# Patient Record
Sex: FEMALE | Race: WHITE | NOT HISPANIC OR LATINO | Employment: OTHER | URBAN - METROPOLITAN AREA
[De-identification: names, ages, dates, MRNs, and addresses within clinical notes are randomized per-mention and may not be internally consistent; named-entity substitution may affect disease eponyms.]

---

## 2017-01-25 ENCOUNTER — OFFICE VISIT (OUTPATIENT)
Dept: BARIATRICS/WEIGHT MGMT | Facility: CLINIC | Age: 52
End: 2017-01-25

## 2017-01-25 VITALS
SYSTOLIC BLOOD PRESSURE: 120 MMHG | WEIGHT: 293 LBS | RESPIRATION RATE: 18 BRPM | HEIGHT: 66 IN | DIASTOLIC BLOOD PRESSURE: 78 MMHG | BODY MASS INDEX: 47.09 KG/M2 | TEMPERATURE: 97.8 F | OXYGEN SATURATION: 99 % | HEART RATE: 66 BPM

## 2017-01-25 DIAGNOSIS — I10 ESSENTIAL HYPERTENSION: ICD-10-CM

## 2017-01-25 DIAGNOSIS — E03.8 OTHER SPECIFIED HYPOTHYROIDISM: ICD-10-CM

## 2017-01-25 DIAGNOSIS — Z98.84 STATUS POST BARIATRIC SURGERY: Primary | ICD-10-CM

## 2017-01-25 DIAGNOSIS — E55.9 VITAMIN D DEFICIENCY: ICD-10-CM

## 2017-01-25 DIAGNOSIS — E53.8 B12 DEFICIENCY: ICD-10-CM

## 2017-01-25 DIAGNOSIS — D50.9 IRON DEFICIENCY ANEMIA, UNSPECIFIED IRON DEFICIENCY ANEMIA TYPE: ICD-10-CM

## 2017-01-25 DIAGNOSIS — E66.01 MORBID OBESITY DUE TO EXCESS CALORIES (HCC): ICD-10-CM

## 2017-01-25 PROCEDURE — 99024 POSTOP FOLLOW-UP VISIT: CPT | Performed by: PHYSICIAN ASSISTANT

## 2017-01-25 RX ORDER — TERBINAFINE HYDROCHLORIDE 250 MG/1
TABLET ORAL
Refills: 0 | COMMUNITY
Start: 2017-01-05 | End: 2017-12-29

## 2017-01-25 RX ORDER — AVOBENZONE, HOMOSALATE, OCTISALATE, OCTOCRYLENE 30; 100; 50; 25 MG/ML; MG/ML; MG/ML; MG/ML
SPRAY TOPICAL
Refills: 0 | COMMUNITY
Start: 2017-01-17 | End: 2017-06-30

## 2017-01-30 LAB
25(OH)D3+25(OH)D2 SERPL-MCNC: 34.3 NG/ML
A-TOCOPHEROL VIT E SERPL-MCNC: 6.2 MG/L (ref 5.3–16.8)
ALBUMIN SERPL-MCNC: 4.3 G/DL (ref 3.2–4.8)
ALBUMIN/GLOB SERPL: 1.4 G/DL (ref 1.5–2.5)
ALP SERPL-CCNC: 104 U/L (ref 25–100)
ALT SERPL-CCNC: 54 U/L (ref 7–40)
AST SERPL-CCNC: 45 U/L (ref 0–33)
BILIRUB SERPL-MCNC: 0.6 MG/DL (ref 0.3–1.2)
BUN SERPL-MCNC: 16 MG/DL (ref 9–23)
BUN/CREAT SERPL: 26.7 (ref 7–25)
CALCIUM SERPL-MCNC: 10.5 MG/DL (ref 8.7–10.4)
CHLORIDE SERPL-SCNC: 103 MMOL/L (ref 99–109)
CO2 SERPL-SCNC: 34 MMOL/L (ref 20–31)
CREAT SERPL-MCNC: 0.6 MG/DL (ref 0.6–1.3)
ERYTHROCYTE [DISTWIDTH] IN BLOOD BY AUTOMATED COUNT: 13.8 % (ref 11.3–14.5)
FERRITIN SERPL-MCNC: 124 NG/ML (ref 10–291)
FOLATE SERPL-MCNC: 12.98 NG/ML (ref 3.2–20)
GLOBULIN SER CALC-MCNC: 3 GM/DL
GLUCOSE SERPL-MCNC: 79 MG/DL (ref 70–100)
HCT VFR BLD AUTO: 48.2 % (ref 34.5–44)
HGB BLD-MCNC: 15.7 G/DL (ref 11.5–15.5)
IRON SERPL-MCNC: 101 MCG/DL (ref 50–175)
MAGNESIUM SERPL-MCNC: 1.8 MG/DL (ref 1.3–2.7)
MCH RBC QN AUTO: 31.2 PG (ref 27–31)
MCHC RBC AUTO-ENTMCNC: 32.6 G/DL (ref 32–36)
MCV RBC AUTO: 95.8 FL (ref 80–99)
METHYLMALONATE SERPL-SCNC: 109 NMOL/L (ref 0–378)
PHOSPHATE SERPL-MCNC: 3.1 MG/DL (ref 2.4–5.1)
PLATELET # BLD AUTO: 175 10*3/MM3 (ref 150–450)
POTASSIUM SERPL-SCNC: 4.6 MMOL/L (ref 3.5–5.5)
PREALB SERPL-MCNC: 17 MG/DL (ref 10–36)
PROT SERPL-MCNC: 7.3 G/DL (ref 5.7–8.2)
PTH-INTACT SERPL-MCNC: 34 PG/ML (ref 15–65)
RBC # BLD AUTO: 5.03 10*6/MM3 (ref 3.89–5.14)
SODIUM SERPL-SCNC: 143 MMOL/L (ref 132–146)
VIT A SERPL-MCNC: 30 UG/DL (ref 20–65)
VIT B1 BLD-SCNC: 209.4 NMOL/L (ref 66.5–200)
WBC # BLD AUTO: 5 10*3/MM3 (ref 3.5–10.8)
ZINC SERPL-MCNC: 88 UG/DL (ref 56–134)

## 2017-03-31 ENCOUNTER — OFFICE VISIT (OUTPATIENT)
Dept: BARIATRICS/WEIGHT MGMT | Facility: CLINIC | Age: 52
End: 2017-03-31

## 2017-03-31 VITALS
HEIGHT: 66 IN | WEIGHT: 278.5 LBS | OXYGEN SATURATION: 99 % | RESPIRATION RATE: 18 BRPM | HEART RATE: 84 BPM | SYSTOLIC BLOOD PRESSURE: 115 MMHG | DIASTOLIC BLOOD PRESSURE: 70 MMHG | TEMPERATURE: 98.8 F | BODY MASS INDEX: 44.76 KG/M2

## 2017-03-31 DIAGNOSIS — R10.13 DYSPEPSIA: ICD-10-CM

## 2017-03-31 DIAGNOSIS — I10 ESSENTIAL HYPERTENSION: ICD-10-CM

## 2017-03-31 DIAGNOSIS — Z98.84 S/P BARIATRIC SURGERY: ICD-10-CM

## 2017-03-31 DIAGNOSIS — R53.83 FATIGUE, UNSPECIFIED TYPE: Primary | ICD-10-CM

## 2017-03-31 DIAGNOSIS — E66.01 MORBID OBESITY, UNSPECIFIED OBESITY TYPE (HCC): ICD-10-CM

## 2017-03-31 PROCEDURE — 99214 OFFICE O/P EST MOD 30 MIN: CPT | Performed by: PHYSICIAN ASSISTANT

## 2017-03-31 RX ORDER — ADALIMUMAB 40MG/0.8ML
KIT SUBCUTANEOUS
COMMUNITY
Start: 2017-03-13

## 2017-03-31 RX ORDER — LISINOPRIL AND HYDROCHLOROTHIAZIDE 12.5; 1 MG/1; MG/1
TABLET ORAL
Refills: 0 | COMMUNITY
Start: 2017-03-05

## 2017-03-31 RX ORDER — MELOXICAM 7.5 MG/1
TABLET ORAL
Refills: 0 | COMMUNITY
Start: 2017-03-05 | End: 2021-03-16

## 2017-03-31 RX ORDER — OMEPRAZOLE 40 MG/1
40 CAPSULE, DELAYED RELEASE ORAL DAILY
COMMUNITY
Start: 2017-03-23 | End: 2021-03-16

## 2017-03-31 NOTE — PROGRESS NOTES
"[facility] Bariatric Surgery  [address]  [facility] [id number] [phone]      Patient Name:  Mayuri Watts.  :  1965      Date of Visit: 3/31/2017      Reason for Visit: 3 months postop    HPI: Mayuri Watts is a 51 y.o. female s/p LSG by Dr. Palencia performed on 16.    Doing well.  Only issue is some hair loss.  Tolerated diet progression w/out issue, but could not stand the protein shots/shakes.  As such getting only 40g prot/day.  Still says \"its hard.\"  1 month labs looked good. Taking MVI, B12, B1, Vit D and iron.  On Omeprazole daily.   Exercising 2 days/week.     Presurgery weight: 345 lbs. Today's weight is 278 lb 8 oz (126 kg) pounds, today's BMI is Body mass index is 45.64 kg/(m^2). and her weight loss since surgery is 67 pounds.       Past Medical History:   Diagnosis Date   • Abnormal PFTs    • Cervical disc disease     PT STATES BULGING DISKS IN NECK, HAS BEEN DOING PHYSICAL THERAPY AND STEROID SHOTS (PAST) FOR THIS   • Chronic back pain     HX OF BACK SURGERY   • Chronic narcotic use    • Depression    • Dermatitis     intertriginous   • Dysphagia     rare cervical dysphagia for steak   • Elevated LFTs    • Fibromyalgia    • Foot pain     LEFT FOOT PAIN, DUE TO TORN TENDON   • HTN (hypertension)    • Hypercholesteremia    • Hypothyroidism    • Joint pain    • Morbid obesity    •  (normal spontaneous vaginal delivery)     x1 w/o complic   • Osteoarthritis    • Polycythemia    • Psoriasis    • Psoriatic arthritis    • Sleep apnea     CPAP compliant - SET AT 16, RESPIRATORY NOTIFIED.   • Stress incontinence    • Vitamin D deficiency    • Wears glasses      Past Surgical History:   Procedure Laterality Date   • CARDIAC CATHETERIZATION      NO INTERVENTION   • COLONOSCOPY      PT STATES POLYPS REMOVED   • DILATION AND CURETTAGE, DIAGNOSTIC / THERAPEUTIC     • GASTRIC SLEEVE LAPAROSCOPIC N/A 2016    Procedure: GASTRIC SLEEVE " LAPAROSCOPIC;  Surgeon: Garrett Palencia MD;  [facility];  Service:    • KNEE ARTHROSCOPY Left 2008   • LAPAROSCOPIC CHOLECYSTECTOMY  2006    no stones. [facility].   • LAPAROSCOPIC TUBAL LIGATION  1990   • LUMBAR FUSION  2012    L4-5 disc and hardware [facility]   • WISDOM TOOTH EXTRACTION       Outpatient Prescriptions Marked as Taking for the 3/31/17 encounter (Office Visit) with RICA Garcia   Medication Sig Dispense Refill   • Calcium Citrate-Vitamin D (CALCIUM + D PO) Take 1 tablet by mouth Daily.     • Cholecalciferol (RA VITAMIN D-3 PO) Take 1,000 Units by mouth Daily.     • gabapentin (NEURONTIN) 300 MG capsule Take 300 mg by mouth 3 (Three) Times a Day. PT STATES SHE NORMALLY ONLY TAKES IT ONCE A DAY     • HUMIRA PEN 40 MG/0.8ML Pen-injector Kit      • levothyroxine (SYNTHROID, LEVOTHROID) 175 MCG tablet Take 175 mcg by mouth Daily.     • lisinopril-hydrochlorothiazide (PRINZIDE,ZESTORETIC) 10-12.5 MG per tablet   0   • meloxicam (MOBIC) 7.5 MG tablet take 1 tablet by mouth once daily if needed  0   • methocarbamol (ROBAXIN) 750 MG tablet Take 750 mg by mouth Daily As Needed for muscle spasms.     • Multiple Vitamins-Minerals (MULTIVITAMIN ADULT PO) Take 1 tablet by mouth Daily.     • omeprazole (priLOSEC) 40 MG capsule      • terbinafine (lamiSIL) 250 MG tablet   0   • venlafaxine XR (EFFEXOR-XR) 150 MG 24 hr capsule Take 150 mg by mouth Daily.       Allergies   Allergen Reactions   • Wellbutrin [Bupropion] Rash     Social History     Social History   • Marital status:      Spouse name: N/A   • Number of children: N/A   • Years of education: N/A     Occupational History   • Not on file.     Social History Main Topics   • Smoking status: Former Smoker     Packs/day: 1.50     Years: 32.00     Types: Cigarettes     Quit date: 2012   • Smokeless tobacco: Never Used      Comment: PT STATES SHE USES THE E-CIGARETTE OCCASIONALLY   • Alcohol use No   • Drug use: No   • Sexual activity:  "Defer     Other Topics Concern   • Not on file     Social History Narrative    Lives in [place].       /70 (BP Location: Left arm, Patient Position: Sitting, Cuff Size: Large Adult)  Pulse 84  Temp 98.8 °F (37.1 °C) (Temporal Artery )   Resp 18  Ht 65.5\" (166.4 cm)  Wt 278 lb 8 oz (126 kg)  SpO2 99%  BMI 45.64 kg/m2    Physical Exam   Constitutional: She is oriented to person, place, and time. She appears well-developed and well-nourished. She is cooperative.   obese   HENT:   Mouth/Throat: Mucous membranes are normal.   Eyes: Conjunctivae are normal.   Cardiovascular: Normal rate and regular rhythm.    Pulmonary/Chest: Effort normal and breath sounds normal.   Abdominal: Soft. Bowel sounds are normal. There is no tenderness.   Incisions well healed   Musculoskeletal: Normal range of motion.   Neurological: She is alert and oriented to person, place, and time.   Skin: Skin is warm and dry.   Psychiatric: She has a normal mood and affect.         Assessment:  3 months s/p LSG by Dr. Palencia performed on 12/21/16.    ICD-10-CM ICD-9-CM   1. Fatigue, unspecified type R53.83 780.79   2. Essential hypertension I10 401.9   3. Dyspepsia R10.13 536.8   4. Morbid obesity, unspecified obesity type E66.01 278.01   5. S/P bariatric surgery Z98.84 V45.86           Plan:  Advised to increase protein to >70g/day.  Encouraged to find a protein supplement that she can tolerate.  Recommended f/up with dietitian to discuss other options.  Increase exercise/activity as tolerated.   Routine labs ordered.  Continue vitamins w/ adjustments pending lab results.  Call w/ issues/concerns.   RTC sooner w/ problems.     The patient was instructed to follow up in 3 months.   "

## 2017-04-03 LAB
ALBUMIN SERPL-MCNC: 3.9 G/DL (ref 3.5–5.5)
ALBUMIN/GLOB SERPL: 1.4 {RATIO} (ref 1.2–2.2)
ALP SERPL-CCNC: 95 IU/L (ref 39–117)
ALT SERPL-CCNC: 41 IU/L (ref 0–32)
AST SERPL-CCNC: 35 IU/L (ref 0–40)
BASOPHILS # BLD AUTO: 0 X10E3/UL (ref 0–0.2)
BASOPHILS NFR BLD AUTO: 1 %
BILIRUB SERPL-MCNC: 0.5 MG/DL (ref 0–1.2)
BUN SERPL-MCNC: 13 MG/DL (ref 6–24)
BUN/CREAT SERPL: 19 (ref 9–23)
CALCIUM SERPL-MCNC: 9.3 MG/DL (ref 8.7–10.2)
CHLORIDE SERPL-SCNC: 103 MMOL/L (ref 96–106)
CO2 SERPL-SCNC: 28 MMOL/L (ref 18–29)
CREAT SERPL-MCNC: 0.69 MG/DL (ref 0.57–1)
EOSINOPHIL # BLD AUTO: 0.2 X10E3/UL (ref 0–0.4)
EOSINOPHIL NFR BLD AUTO: 3 %
ERYTHROCYTE [DISTWIDTH] IN BLOOD BY AUTOMATED COUNT: 14.6 % (ref 12.3–15.4)
FERRITIN SERPL-MCNC: 203 NG/ML (ref 15–150)
FOLATE SERPL-MCNC: 16.5 NG/ML
GLOBULIN SER CALC-MCNC: 2.8 G/DL (ref 1.5–4.5)
GLUCOSE SERPL-MCNC: 93 MG/DL (ref 65–99)
HCT VFR BLD AUTO: 44 % (ref 34–46.6)
HGB BLD-MCNC: 14.5 G/DL (ref 11.1–15.9)
IMM GRANULOCYTES # BLD: 0 X10E3/UL (ref 0–0.1)
IMM GRANULOCYTES NFR BLD: 0 %
IRON SERPL-MCNC: 104 UG/DL (ref 27–159)
LYMPHOCYTES # BLD AUTO: 2.9 X10E3/UL (ref 0.7–3.1)
LYMPHOCYTES NFR BLD AUTO: 43 %
MCH RBC QN AUTO: 30.4 PG (ref 26.6–33)
MCHC RBC AUTO-ENTMCNC: 33 G/DL (ref 31.5–35.7)
MCV RBC AUTO: 92 FL (ref 79–97)
METHYLMALONATE SERPL-SCNC: 210 NMOL/L (ref 0–378)
MONOCYTES # BLD AUTO: 0.6 X10E3/UL (ref 0.1–0.9)
MONOCYTES NFR BLD AUTO: 8 %
NEUTROPHILS # BLD AUTO: 3.1 X10E3/UL (ref 1.4–7)
NEUTROPHILS NFR BLD AUTO: 45 %
PLATELET # BLD AUTO: 203 X10E3/UL (ref 150–379)
POTASSIUM SERPL-SCNC: 4 MMOL/L (ref 3.5–5.2)
PREALB SERPL-MCNC: 17 MG/DL (ref 10–36)
PROT SERPL-MCNC: 6.7 G/DL (ref 6–8.5)
RBC # BLD AUTO: 4.77 X10E6/UL (ref 3.77–5.28)
SODIUM SERPL-SCNC: 146 MMOL/L (ref 134–144)
VIT B1 BLD-SCNC: 128.5 NMOL/L (ref 66.5–200)
WBC # BLD AUTO: 6.7 X10E3/UL (ref 3.4–10.8)

## 2017-06-30 ENCOUNTER — OFFICE VISIT (OUTPATIENT)
Dept: BARIATRICS/WEIGHT MGMT | Facility: CLINIC | Age: 52
End: 2017-06-30

## 2017-06-30 VITALS
WEIGHT: 247.5 LBS | HEIGHT: 66 IN | TEMPERATURE: 98.2 F | RESPIRATION RATE: 18 BRPM | HEART RATE: 94 BPM | SYSTOLIC BLOOD PRESSURE: 110 MMHG | BODY MASS INDEX: 39.77 KG/M2 | OXYGEN SATURATION: 99 % | DIASTOLIC BLOOD PRESSURE: 78 MMHG

## 2017-06-30 DIAGNOSIS — Z98.84 S/P BARIATRIC SURGERY: ICD-10-CM

## 2017-06-30 DIAGNOSIS — E78.00 HYPERCHOLESTEREMIA: ICD-10-CM

## 2017-06-30 DIAGNOSIS — Z99.89 OSA ON CPAP: Primary | ICD-10-CM

## 2017-06-30 DIAGNOSIS — I10 ESSENTIAL HYPERTENSION: ICD-10-CM

## 2017-06-30 DIAGNOSIS — R79.0 ABNORMAL BLOOD LEVEL OF IRON: ICD-10-CM

## 2017-06-30 DIAGNOSIS — G47.33 OSA ON CPAP: Primary | ICD-10-CM

## 2017-06-30 DIAGNOSIS — E55.9 VITAMIN D DEFICIENCY: ICD-10-CM

## 2017-06-30 DIAGNOSIS — E03.9 HYPOTHYROIDISM, UNSPECIFIED TYPE: ICD-10-CM

## 2017-06-30 DIAGNOSIS — E66.01 MORBID OBESITY, UNSPECIFIED OBESITY TYPE (HCC): ICD-10-CM

## 2017-06-30 PROCEDURE — 99214 OFFICE O/P EST MOD 30 MIN: CPT | Performed by: PHYSICIAN ASSISTANT

## 2017-06-30 NOTE — PROGRESS NOTES
[facility] Bariatric Surgery  [address]  [facility] [id number] [phone]      Patient Name:  Mayuri Watts.  :  1965      Date of Visit: 2017      Reason for Visit: 6 months postop    HPI: Mayuri Watts is a 51 y.o. female s/p LSG by Dr. Palencia performed on 16.    Doing well.  No issues/concerns.  Hair loss has improved.  Continues to focus on protein - meats, cheeses, nuts.  Not tracking intake, but suspects she is getting 40-70g/day.  Says good food choices are just part of her routine now.  3 month labs looked good. Taking MVI, B12, B1, Vit D, iron and now on Biotin.  On Omeprazole but now taking twice daily.  Says never had heartburn issues before surgery.   Exercising 3 days/week.     Presurgery weight: 345 lbs. Today's weight is 247 lb 8 oz (112 kg) pounds, today's BMI is Body mass index is 40.56 kg/(m^2). and her weight loss since surgery is 98 pounds.       Past Medical History:   Diagnosis Date   • Abnormal PFTs    • Cervical disc disease     PT STATES BULGING DISKS IN NECK, HAS BEEN DOING PHYSICAL THERAPY AND STEROID SHOTS (PAST) FOR THIS   • Chronic back pain     HX OF BACK SURGERY   • Chronic narcotic use    • Depression    • Dysphagia     rare cervical dysphagia for steak   • Elevated LFTs    • Fibromyalgia    • Foot pain     LEFT FOOT PAIN, DUE TO TORN TENDON   • HTN (hypertension)    • Hypercholesteremia    • Hypothyroidism    • Joint pain    • Morbid obesity    •  (normal spontaneous vaginal delivery)     x1 w/o complic   • Osteoarthritis    • Polycythemia    • Psoriasis    • Psoriatic arthritis    • Sleep apnea     CPAP compliant - SET AT 16, RESPIRATORY NOTIFIED.   • Stress incontinence    • Vitamin D deficiency    • Wears glasses      Past Surgical History:   Procedure Laterality Date   • CARDIAC CATHETERIZATION      NO INTERVENTION   • COLONOSCOPY      PT STATES POLYPS REMOVED   • DILATION AND CURETTAGE, DIAGNOSTIC /  THERAPEUTIC     • GASTRIC SLEEVE LAPAROSCOPIC N/A 12/21/2016    Procedure: GASTRIC SLEEVE LAPAROSCOPIC;  Surgeon: Garrett Palencia MD;  [facility]: [facility];  Service:    • KNEE ARTHROSCOPY Left 2008   • LAPAROSCOPIC CHOLECYSTECTOMY  2006    no stones. [facility].   • LAPAROSCOPIC TUBAL LIGATION  1990   • LUMBAR FUSION  2012    L4-5 disc and hardware [facility]   • WISDOM TOOTH EXTRACTION       Outpatient Prescriptions Marked as Taking for the 6/30/17 encounter (Office Visit) with RICA Garcia   Medication Sig Dispense Refill   • Cholecalciferol (RA VITAMIN D-3 PO) Take 1,000 Units by mouth Daily.     • gabapentin (NEURONTIN) 300 MG capsule Take 300 mg by mouth 3 (Three) Times a Day. PT STATES SHE NORMALLY ONLY TAKES IT ONCE A DAY     • HUMIRA PEN 40 MG/0.8ML Pen-injector Kit      • HYDROcodone-acetaminophen (NORCO) 5-325 MG per tablet Take 1 tablet by mouth Every 6 (Six) Hours As Needed for moderate pain (4-6). 30 tablet 0   • levothyroxine (SYNTHROID, LEVOTHROID) 175 MCG tablet Take 175 mcg by mouth Daily.     • lisinopril-hydrochlorothiazide (PRINZIDE,ZESTORETIC) 10-12.5 MG per tablet   0   • meloxicam (MOBIC) 7.5 MG tablet take 1 tablet by mouth once daily if needed  0   • methocarbamol (ROBAXIN) 750 MG tablet Take 750 mg by mouth Daily As Needed for muscle spasms.     • Multiple Vitamins-Minerals (MULTIVITAMIN ADULT PO) Take 1 tablet by mouth Daily.     • omeprazole (priLOSEC) 40 MG capsule 40 mg Daily. Taking 2 of a morning and 1 at night     • terbinafine (lamiSIL) 250 MG tablet   0   • venlafaxine XR (EFFEXOR-XR) 150 MG 24 hr capsule Take 150 mg by mouth Daily.     • [DISCONTINUED] lisinopril (PRINIVIL,ZESTRIL) 10 MG tablet Take 1 tablet by mouth Daily. 30 tablet 0     Allergies   Allergen Reactions   • Wellbutrin [Bupropion] Rash     Social History     Social History   • Marital status:      Spouse name: N/A   • Number of children: N/A   • Years of education: N/A     Occupational History  "  • Not on file.     Social History Main Topics   • Smoking status: Former Smoker     Packs/day: 1.50     Years: 32.00     Types: Cigarettes     Quit date: 2012   • Smokeless tobacco: Never Used      Comment: PT STATES SHE USES THE E-CIGARETTE OCCASIONALLY   • Alcohol use No   • Drug use: No   • Sexual activity: Defer     Other Topics Concern   • Not on file     Social History Narrative    Lives in [facility].       /78 (BP Location: Left arm, Patient Position: Sitting, Cuff Size: Large Adult)  Pulse 94  Temp 98.2 °F (36.8 °C) (Temporal Artery )   Resp 18  Ht 65.5\" (166.4 cm)  Wt 247 lb 8 oz (112 kg)  SpO2 99%  BMI 40.56 kg/m2    Physical Exam   Constitutional: She is oriented to person, place, and time. She appears well-developed and well-nourished. She is cooperative.   obese   HENT:   Mouth/Throat: Mucous membranes are normal.   Eyes: Conjunctivae are normal.   Cardiovascular: Normal rate and regular rhythm.    Pulmonary/Chest: Effort normal and breath sounds normal.   Abdominal: Soft. Bowel sounds are normal. There is no tenderness.   Incisions well healed   Musculoskeletal: Normal range of motion.   Neurological: She is alert and oriented to person, place, and time.   Skin: Skin is warm and dry.   Psychiatric: She has a normal mood and affect.         Assessment:  6 months s/p LSG by Dr. Palencia performed on 12/21/16.    ICD-10-CM ICD-9-CM   1. BHAVANA on CPAP G47.33 327.23   2. Essential hypertension I10 401.9   3. Hypercholesteremia E78.00 272.0   4. Hypothyroidism, unspecified type E03.9 244.9   5. Abnormal blood level of iron R79.0 790.6   6. Vitamin D deficiency E55.9 268.9   7. Morbid obesity, unspecified obesity type E66.01 278.01   8. S/P bariatric surgery Z98.84 V45.86         Plan:  Continue w/ good food choices and healthy habits.  Continue routine exercise.  Routine labs ordered.  Continue vitamins w/ adjustments pending lab results.  Call w/ issues/concerns.   RTC sooner w/ problems.     The " patient was instructed to follow up in 6 months.     note: approx 15 of the 25 minute visit was spent counseling on dietary/lifestyle modifications

## 2017-07-06 LAB
25(OH)D3+25(OH)D2 SERPL-MCNC: 30.4 NG/ML (ref 30–100)
ALBUMIN SERPL-MCNC: 4 G/DL (ref 3.5–5.5)
ALBUMIN/GLOB SERPL: 1.4 {RATIO} (ref 1.2–2.2)
ALP SERPL-CCNC: 100 IU/L (ref 39–117)
ALT SERPL-CCNC: 35 IU/L (ref 0–32)
AST SERPL-CCNC: 35 IU/L (ref 0–40)
BASOPHILS # BLD AUTO: 0.1 X10E3/UL (ref 0–0.2)
BASOPHILS NFR BLD AUTO: 1 %
BILIRUB SERPL-MCNC: 0.3 MG/DL (ref 0–1.2)
BUN SERPL-MCNC: 18 MG/DL (ref 6–24)
BUN/CREAT SERPL: 26 (ref 9–23)
CALCIUM SERPL-MCNC: 9.2 MG/DL (ref 8.7–10.2)
CHLORIDE SERPL-SCNC: 102 MMOL/L (ref 96–106)
CO2 SERPL-SCNC: 28 MMOL/L (ref 18–29)
CREAT SERPL-MCNC: 0.69 MG/DL (ref 0.57–1)
EOSINOPHIL # BLD AUTO: 0.6 X10E3/UL (ref 0–0.4)
EOSINOPHIL NFR BLD AUTO: 8 %
ERYTHROCYTE [DISTWIDTH] IN BLOOD BY AUTOMATED COUNT: 13.4 % (ref 12.3–15.4)
FERRITIN SERPL-MCNC: 160 NG/ML (ref 15–150)
FOLATE SERPL-MCNC: 14.6 NG/ML
GLOBULIN SER CALC-MCNC: 2.8 G/DL (ref 1.5–4.5)
GLUCOSE SERPL-MCNC: 76 MG/DL (ref 65–99)
HCT VFR BLD AUTO: 48.1 % (ref 34–46.6)
HGB BLD-MCNC: 15.9 G/DL (ref 11.1–15.9)
IMM GRANULOCYTES # BLD: 0 X10E3/UL (ref 0–0.1)
IMM GRANULOCYTES NFR BLD: 0 %
IRON SERPL-MCNC: 117 UG/DL (ref 27–159)
LYMPHOCYTES # BLD AUTO: 2.6 X10E3/UL (ref 0.7–3.1)
LYMPHOCYTES NFR BLD AUTO: 37 %
MCH RBC QN AUTO: 31.7 PG (ref 26.6–33)
MCHC RBC AUTO-ENTMCNC: 33.1 G/DL (ref 31.5–35.7)
MCV RBC AUTO: 96 FL (ref 79–97)
METHYLMALONATE SERPL-SCNC: 189 NMOL/L (ref 0–378)
MONOCYTES # BLD AUTO: 0.7 X10E3/UL (ref 0.1–0.9)
MONOCYTES NFR BLD AUTO: 9 %
NEUTROPHILS # BLD AUTO: 3.2 X10E3/UL (ref 1.4–7)
NEUTROPHILS NFR BLD AUTO: 45 %
PLATELET # BLD AUTO: 205 X10E3/UL (ref 150–379)
POTASSIUM SERPL-SCNC: 4.2 MMOL/L (ref 3.5–5.2)
PREALB SERPL-MCNC: 24 MG/DL (ref 10–36)
PROT SERPL-MCNC: 6.8 G/DL (ref 6–8.5)
RBC # BLD AUTO: 5.02 X10E6/UL (ref 3.77–5.28)
SODIUM SERPL-SCNC: 145 MMOL/L (ref 134–144)
TSH SERPL DL<=0.005 MIU/L-ACNC: 0.02 UIU/ML (ref 0.45–4.5)
VIT B1 BLD-SCNC: 133.8 NMOL/L (ref 66.5–200)
WBC # BLD AUTO: 7.1 X10E3/UL (ref 3.4–10.8)

## 2017-12-29 ENCOUNTER — OFFICE VISIT (OUTPATIENT)
Dept: BARIATRICS/WEIGHT MGMT | Facility: CLINIC | Age: 52
End: 2017-12-29

## 2017-12-29 VITALS
RESPIRATION RATE: 18 BRPM | TEMPERATURE: 97.9 F | DIASTOLIC BLOOD PRESSURE: 73 MMHG | SYSTOLIC BLOOD PRESSURE: 118 MMHG | HEIGHT: 66 IN | WEIGHT: 207.51 LBS | BODY MASS INDEX: 33.35 KG/M2 | HEART RATE: 89 BPM | OXYGEN SATURATION: 99 %

## 2017-12-29 DIAGNOSIS — F32.A DEPRESSION, UNSPECIFIED DEPRESSION TYPE: ICD-10-CM

## 2017-12-29 DIAGNOSIS — E61.1 IRON DEFICIENCY: ICD-10-CM

## 2017-12-29 DIAGNOSIS — M54.9 CHRONIC BACK PAIN, UNSPECIFIED BACK LOCATION, UNSPECIFIED BACK PAIN LATERALITY: ICD-10-CM

## 2017-12-29 DIAGNOSIS — E03.9 HYPOTHYROIDISM, UNSPECIFIED TYPE: ICD-10-CM

## 2017-12-29 DIAGNOSIS — I10 ESSENTIAL HYPERTENSION: Primary | ICD-10-CM

## 2017-12-29 DIAGNOSIS — E55.9 VITAMIN D DEFICIENCY: ICD-10-CM

## 2017-12-29 DIAGNOSIS — G89.29 CHRONIC BACK PAIN, UNSPECIFIED BACK LOCATION, UNSPECIFIED BACK PAIN LATERALITY: ICD-10-CM

## 2017-12-29 PROCEDURE — 99214 OFFICE O/P EST MOD 30 MIN: CPT | Performed by: PHYSICIAN ASSISTANT

## 2018-01-04 LAB
25(OH)D3+25(OH)D2 SERPL-MCNC: 45.6 NG/ML (ref 30–100)
A-TOCOPHEROL VIT E SERPL-MCNC: 7.6 MG/L (ref 5.3–16.8)
ALBUMIN SERPL-MCNC: 3.9 G/DL (ref 3.5–5.5)
ALBUMIN/GLOB SERPL: 1.1 {RATIO} (ref 1.2–2.2)
ALP SERPL-CCNC: 105 IU/L (ref 39–117)
ALT SERPL-CCNC: 30 IU/L (ref 0–32)
AST SERPL-CCNC: 25 IU/L (ref 0–40)
BASOPHILS # BLD AUTO: 0 X10E3/UL (ref 0–0.2)
BASOPHILS NFR BLD AUTO: 0 %
BILIRUB SERPL-MCNC: 0.3 MG/DL (ref 0–1.2)
BUN SERPL-MCNC: 17 MG/DL (ref 6–24)
BUN/CREAT SERPL: 24 (ref 9–23)
CALCIUM SERPL-MCNC: 10.1 MG/DL (ref 8.7–10.2)
CHLORIDE SERPL-SCNC: 99 MMOL/L (ref 96–106)
CO2 SERPL-SCNC: 26 MMOL/L (ref 18–29)
CREAT SERPL-MCNC: 0.72 MG/DL (ref 0.57–1)
EOSINOPHIL # BLD AUTO: 0.2 X10E3/UL (ref 0–0.4)
EOSINOPHIL NFR BLD AUTO: 2 %
ERYTHROCYTE [DISTWIDTH] IN BLOOD BY AUTOMATED COUNT: 13.4 % (ref 12.3–15.4)
FERRITIN SERPL-MCNC: 182 NG/ML (ref 15–150)
FOLATE SERPL-MCNC: 6.6 NG/ML
GLOBULIN SER CALC-MCNC: 3.5 G/DL (ref 1.5–4.5)
GLUCOSE SERPL-MCNC: 86 MG/DL (ref 65–99)
HCT VFR BLD AUTO: 50.4 % (ref 34–46.6)
HGB BLD-MCNC: 16.9 G/DL (ref 11.1–15.9)
IMM GRANULOCYTES # BLD: 0 X10E3/UL (ref 0–0.1)
IMM GRANULOCYTES NFR BLD: 0 %
IRON SERPL-MCNC: 104 UG/DL (ref 27–159)
LYMPHOCYTES # BLD AUTO: 3.3 X10E3/UL (ref 0.7–3.1)
LYMPHOCYTES NFR BLD AUTO: 32 %
MAGNESIUM SERPL-MCNC: 2 MG/DL (ref 1.6–2.3)
MCH RBC QN AUTO: 31.1 PG (ref 26.6–33)
MCHC RBC AUTO-ENTMCNC: 33.5 G/DL (ref 31.5–35.7)
MCV RBC AUTO: 93 FL (ref 79–97)
METHYLMALONATE SERPL-SCNC: 245 NMOL/L (ref 0–378)
MONOCYTES # BLD AUTO: 0.8 X10E3/UL (ref 0.1–0.9)
MONOCYTES NFR BLD AUTO: 8 %
NEUTROPHILS # BLD AUTO: 6 X10E3/UL (ref 1.4–7)
NEUTROPHILS NFR BLD AUTO: 58 %
PHOSPHATE SERPL-MCNC: 4.4 MG/DL (ref 2.5–4.5)
PLATELET # BLD AUTO: 263 X10E3/UL (ref 150–379)
POTASSIUM SERPL-SCNC: 4.9 MMOL/L (ref 3.5–5.2)
PREALB SERPL-MCNC: 22 MG/DL (ref 10–36)
PROT SERPL-MCNC: 7.4 G/DL (ref 6–8.5)
PTH-INTACT SERPL-MCNC: 30 PG/ML (ref 15–65)
RBC # BLD AUTO: 5.44 X10E6/UL (ref 3.77–5.28)
SODIUM SERPL-SCNC: 143 MMOL/L (ref 134–144)
VIT A SERPL-MCNC: 43 UG/DL (ref 20–65)
VIT B1 BLD-SCNC: 138.1 NMOL/L (ref 66.5–200)
WBC # BLD AUTO: 10.3 X10E3/UL (ref 3.4–10.8)
ZINC SERPL-MCNC: 70 UG/DL (ref 56–134)

## 2018-01-12 ENCOUNTER — OFFICE VISIT (OUTPATIENT)
Dept: OBSTETRICS AND GYNECOLOGY | Facility: CLINIC | Age: 53
End: 2018-01-12

## 2018-01-12 VITALS
BODY MASS INDEX: 34.23 KG/M2 | HEIGHT: 66 IN | SYSTOLIC BLOOD PRESSURE: 126 MMHG | WEIGHT: 213 LBS | DIASTOLIC BLOOD PRESSURE: 66 MMHG

## 2018-01-12 DIAGNOSIS — Z12.31 ENCOUNTER FOR SCREENING MAMMOGRAM FOR BREAST CANCER: ICD-10-CM

## 2018-01-12 DIAGNOSIS — N95.1 MENOPAUSAL SYMPTOMS: ICD-10-CM

## 2018-01-12 DIAGNOSIS — R39.89 POSSIBLE URINARY TRACT INFECTION: ICD-10-CM

## 2018-01-12 DIAGNOSIS — Z01.419 ENCOUNTER FOR GYNECOLOGICAL EXAMINATION WITHOUT ABNORMAL FINDING: Primary | ICD-10-CM

## 2018-01-12 PROCEDURE — G0101 CA SCREEN;PELVIC/BREAST EXAM: HCPCS | Performed by: OBSTETRICS & GYNECOLOGY

## 2018-01-12 RX ORDER — DESONIDE 0.5 MG/G
CREAM TOPICAL
Refills: 0 | COMMUNITY
Start: 2017-12-14 | End: 2018-06-29

## 2018-01-12 RX ORDER — LEVOTHYROXINE SODIUM 137 UG/1
TABLET ORAL
Refills: 0 | COMMUNITY
Start: 2017-12-16 | End: 2023-03-28

## 2018-01-12 RX ORDER — GABAPENTIN 600 MG/1
600 TABLET ORAL 4 TIMES DAILY
Refills: 0 | COMMUNITY
Start: 2017-12-02

## 2018-01-12 NOTE — PROGRESS NOTES
Subjective  Chief Complaint   Patient presents with   • Gynecologic Exam     Patient is 52 y.o.  here for annual examination.  Pt with complaints of no menses in over 1 year with menopausal symptoms.  Pt also with vaginal pain/dryness and discomfort.  Pt also with urgency and urge incont.  Pt denies any stress incontinence.  Pt had MMG done in November; Dk Singh and reports normal.    History  Past Medical History:   Diagnosis Date   • Abnormal PFTs    • Anxiety    • Asthma    • Cervical disc disease     PT STATES BULGING DISKS IN NECK, HAS BEEN DOING PHYSICAL THERAPY AND STEROID SHOTS (PAST) FOR THIS   • Chronic back pain     HX OF BACK SURGERY   • Chronic narcotic use    • Depression    • Dysphagia     rare cervical dysphagia for steak   • Elevated LFTs    • Fibromyalgia    • Foot pain     LEFT FOOT PAIN, DUE TO TORN TENDON   • GERD (gastroesophageal reflux disease)    • History of Papanicolaou smear of cervix 2015    NORMAL    • HTN (hypertension)    • Hypercholesteremia    • Hypothyroidism    • Joint pain    • Morbid obesity    •  (normal spontaneous vaginal delivery)     x1 w/o complic   • Osteoarthritis    • Osteoporosis    • Polycythemia    • Psoriasis    • Psoriatic arthritis    • Sleep apnea     CPAP compliant - SET AT 16, RESPIRATORY NOTIFIED.   • Stress incontinence    • Vitamin D deficiency    • Wears glasses      Current Outpatient Prescriptions on File Prior to Visit   Medication Sig Dispense Refill   • HUMIRA PEN 40 MG/0.8ML Pen-injector Kit      • HYDROcodone-acetaminophen (NORCO) 5-325 MG per tablet Take 1 tablet by mouth Every 6 (Six) Hours As Needed for moderate pain (4-6). 30 tablet 0   • lisinopril-hydrochlorothiazide (PRINZIDE,ZESTORETIC) 10-12.5 MG per tablet   0   • meloxicam (MOBIC) 7.5 MG tablet take 1 tablet by mouth once daily if needed  0   • methocarbamol (ROBAXIN) 750 MG tablet Take 750 mg by mouth Daily As Needed for muscle spasms.     • Multiple Vitamins-Minerals  (MULTIVITAMIN ADULT PO) Take 1 tablet by mouth Daily.     • omeprazole (priLOSEC) 40 MG capsule 40 mg Daily. Taking 2 of a morning and 1 at night     • venlafaxine XR (EFFEXOR-XR) 150 MG 24 hr capsule Take 150 mg by mouth Daily.     • [DISCONTINUED] gabapentin (NEURONTIN) 300 MG capsule Take 300 mg by mouth 3 (Three) Times a Day. PT STATES SHE NORMALLY ONLY TAKES IT ONCE A DAY     • [DISCONTINUED] levothyroxine (SYNTHROID, LEVOTHROID) 175 MCG tablet Take 175 mcg by mouth Daily.       No current facility-administered medications on file prior to visit.      Allergies   Allergen Reactions   • Wellbutrin [Bupropion] Rash     Past Surgical History:   Procedure Laterality Date   • CARDIAC CATHETERIZATION  2012    NO INTERVENTION   • COLONOSCOPY  2009    PT STATES POLYPS REMOVED   • DILATION AND CURETTAGE, DIAGNOSTIC / THERAPEUTIC     • GASTRIC SLEEVE LAPAROSCOPIC N/A 12/21/2016    Procedure: GASTRIC SLEEVE LAPAROSCOPIC;  Surgeon: Garrett Palencia MD;  Location: [facility];  Service:    • KNEE ARTHROSCOPY Left 2008   • LAPAROSCOPIC CHOLECYSTECTOMY  2006    [facility]   • LAPAROSCOPIC TUBAL LIGATION  1990   • LUMBAR FUSION  2012    L4-5 disc and hardware [facility]   • WISDOM TOOTH EXTRACTION       Family History   Problem Relation Age of Onset   • Breast cancer Mother    • Hypertension Mother    • Heart attack Sister    • Hypertension Sister    • Sleep apnea Sister    • Obesity Sister    • Diabetes Sister      Social History     Social History   • Marital status:      Spouse name: N/A   • Number of children: N/A   • Years of education: N/A     Social History Main Topics   • Smoking status: Former Smoker     Packs/day: 1.50     Years: 32.00     Types: Cigarettes     Quit date: 2012   • Smokeless tobacco: Never Used      Comment: PT STATES SHE USES THE E-CIGARETTE OCCASIONALLY   • Alcohol use No   • Drug use: No   • Sexual activity: Yes     Partners: Male     Other Topics Concern   • None     Social  "History Narrative    Lives in [facility].     Review of Systems  All systems were reviewed and negative except for:  Constitution:  positive for trouble sleeping  Eyes:  positive for change of vision  Respiratory: positive for  wheezing  Cardiovascular: positive for  chest pressure / pain, at rest  Genitourinary: postivie for  difficulty/inability to void, urinary incontinence, urinary urgency and pelvic pain  Hematologic / Lymphatic: positive for  excessive bleeding and easy bruising  Endocrine: positive for  hot flashes     Objective  Vitals:    01/12/18 1058   BP: 126/66   Weight: 96.6 kg (213 lb)   Height: 166.4 cm (65.5\")     Physical Exam:  General Appearance: alert, appears stated age and cooperative  Head: normocephalic, without obvious abnormality and atraumatic  Eyes: lids and lashes normal, conjunctivae and sclerae normal, no icterus, no pallor, corneas clear and PERRLA  Ears: ears appear intact with no abnormalities noted  Nose: nares normal, septum midline, mucosa normal and no drainage  Neck: suppple, trachea midline and no thyromegaly  Lungs: clear to auscultation, respirations regular, respirations even and respirations unlabored  Heart: regular rhythm and normal rate, normal S1, S2, no murmur, gallop, or rubs and no click  Breasts: Examined in supine position  Symmetric without masses or skin dimpling  Nipples normal without inversion, lesions or discharge  There are no palpable axillary nodes  Abdomen: normal bowel sounds, no masses, no hepatomegaly, no splenomegaly, soft non-tender, no guarding and no rebound tenderness  Pelvic: Clinical staff was present for exam  External genitalia:  normal appearance of the external genitalia including Bartholin's and [place]'s glands.  :  urethral meatus normal;  Vaginal:  atrophic mucosal changes are present;  Cervix:  normal appearance.  Uterus:  normal size, shape and consistency.  Adnexa:  normal bimanual exam of the adnexa.  Extremities: moves extremities " well, no edema, no cyanosis and no redness  Skin: no bleeding, bruising or rash and no lesions noted  Lymph Nodes: no palpable adenopathy  Psych: normal mood and affect, oriented to person, time and place, thought content organized and appropriate judgment    Lab Review   No data reviewed    Imaging   No data reviewed    Assessment/Plan    Problem List Items Addressed This Visit     None      Visit Diagnoses     Encounter for gynecological examination without abnormal finding    -  Primary  Pap was done today.  If she does not receive the results of the Pap within 2 weeks  time, she was instructed to call to find out the results.  I explained to Mayuri that the recommendations for Pap smear interval in a low risk patient has lengthened to 3 years time if cytology alone normal or  5 years time if both cytology and HPV testing were normal.  I encouraged her to be seen yearly for a full physical exam including breast and pelvic exam even during the off years when PAP's will not be performed.    Relevant Orders    Pap IG, Rfx HPV ASCU - ThinPrep Vial, Cervix    Encounter for screening mammogram for breast cancer      It is recommended per ACOG, for women at average risk to start annual mammogram screening at the age of 40 until the age of 75 and an individualized decision be made for women after age 75.  She was encouraged to continue getting yearly mammograms.  She should report any palpable breast lump(s) or skin changes regardless of mammographic findings.  I explained to Mayuri that notification regarding her mammogram results will come from the center performing the study.  Our office will not be routinely calling with mammogram results.  It is her responsibility to make sure that the results from the mammogram are communicated to her by the breast center.  If she has any questions about the results, she is welcome to call our office anytime.  MMG done.    Menopausal symptoms        Relevant Orders    Follicle  Stimulating Hormone    Estradiol    Testosterone, Free, Total    Possible urinary tract infection      Urine culture sent; pt to call for results.    Relevant Orders    Urine Culture - Urine, Urine, Clean Catch    Urinalysis With Microscopic - Urine, Clean Catch            Follow up 1 year or prn     This note was electronically signed.  Pita Hooker M.D.

## 2018-01-14 LAB
ESTRADIOL SERPL-MCNC: <5 PG/ML
FSH SERPL-ACNC: 28.4 MIU/ML
TESTOST FREE SERPL-MCNC: 0.7 PG/ML (ref 0–4.2)
TESTOST SERPL-MCNC: 6 NG/DL (ref 3–41)

## 2018-01-18 LAB
APPEARANCE UR: (no result)
BACTERIA #/AREA URNS HPF: ABNORMAL /HPF
BACTERIA UR CULT: ABNORMAL
BACTERIA UR CULT: ABNORMAL
BILIRUB UR QL STRIP: NEGATIVE
COLOR UR: YELLOW
EPI CELLS #/AREA URNS HPF: ABNORMAL /HPF
GLUCOSE UR QL: NEGATIVE
HGB UR QL STRIP: NEGATIVE
KETONES UR QL STRIP: NEGATIVE
LEUKOCYTE ESTERASE UR QL STRIP: (no result)
NITRITE UR QL STRIP: POSITIVE
OTHER ANTIBIOTIC SUSC ISLT: ABNORMAL
PH UR STRIP: 8.5 [PH] (ref 5–8)
PROT UR QL STRIP: NEGATIVE
RBC #/AREA URNS HPF: ABNORMAL /HPF
SP GR UR: 1.01 (ref 1–1.03)
UROBILINOGEN UR STRIP-MCNC: (no result) MG/DL
WBC #/AREA URNS HPF: ABNORMAL /HPF

## 2018-01-19 ENCOUNTER — TELEPHONE (OUTPATIENT)
Dept: OBSTETRICS AND GYNECOLOGY | Facility: CLINIC | Age: 53
End: 2018-01-19

## 2018-01-19 RX ORDER — SULFAMETHOXAZOLE AND TRIMETHOPRIM 800; 160 MG/1; MG/1
1 TABLET ORAL 2 TIMES DAILY
Qty: 14 TABLET | Refills: 0 | Status: SHIPPED | OUTPATIENT
Start: 2018-01-19 | End: 2018-01-26

## 2018-01-19 NOTE — TELEPHONE ENCOUNTER
----- Message from Pita Hooker MD sent at 1/19/2018  8:53 AM EST -----  Need to inform pt urine culture +ecoli; needs rx bactrim ds 1 po bid x 7d

## 2018-01-22 DIAGNOSIS — Z01.419 ENCOUNTER FOR GYNECOLOGICAL EXAMINATION WITHOUT ABNORMAL FINDING: ICD-10-CM

## 2018-06-29 ENCOUNTER — OFFICE VISIT (OUTPATIENT)
Dept: BARIATRICS/WEIGHT MGMT | Facility: CLINIC | Age: 53
End: 2018-06-29

## 2018-06-29 VITALS
BODY MASS INDEX: 33.91 KG/M2 | WEIGHT: 211 LBS | HEIGHT: 66 IN | HEART RATE: 76 BPM | SYSTOLIC BLOOD PRESSURE: 130 MMHG | DIASTOLIC BLOOD PRESSURE: 84 MMHG | RESPIRATION RATE: 18 BRPM | OXYGEN SATURATION: 99 % | TEMPERATURE: 97.8 F

## 2018-06-29 DIAGNOSIS — R10.13 DYSPEPSIA: Primary | ICD-10-CM

## 2018-06-29 DIAGNOSIS — Z98.84 S/P BARIATRIC SURGERY: ICD-10-CM

## 2018-06-29 DIAGNOSIS — R53.83 FATIGUE, UNSPECIFIED TYPE: ICD-10-CM

## 2018-06-29 DIAGNOSIS — E66.9 OBESITY, CLASS I, BMI 30-34.9: ICD-10-CM

## 2018-06-29 PROCEDURE — 99214 OFFICE O/P EST MOD 30 MIN: CPT | Performed by: PHYSICIAN ASSISTANT

## 2018-10-19 ENCOUNTER — TELEPHONE (OUTPATIENT)
Dept: SURGERY | Facility: CLINIC | Age: 53
End: 2018-10-19

## 2018-11-02 ENCOUNTER — TELEPHONE (OUTPATIENT)
Dept: SURGERY | Facility: CLINIC | Age: 53
End: 2018-11-02

## 2018-11-05 ENCOUNTER — OFFICE VISIT (OUTPATIENT)
Dept: SURGERY | Facility: CLINIC | Age: 53
End: 2018-11-05

## 2018-11-05 VITALS
DIASTOLIC BLOOD PRESSURE: 78 MMHG | OXYGEN SATURATION: 96 % | BODY MASS INDEX: 35.52 KG/M2 | HEART RATE: 85 BPM | WEIGHT: 213.2 LBS | TEMPERATURE: 98.3 F | SYSTOLIC BLOOD PRESSURE: 142 MMHG | HEIGHT: 65 IN

## 2018-11-05 DIAGNOSIS — Z86.010 HISTORY OF COLON POLYPS: ICD-10-CM

## 2018-11-05 DIAGNOSIS — K62.5 RECTAL BLEED: Primary | ICD-10-CM

## 2018-11-05 PROCEDURE — 99203 OFFICE O/P NEW LOW 30 MIN: CPT | Performed by: SURGERY

## 2018-11-05 RX ORDER — BISACODYL 5 MG/1
5 TABLET, DELAYED RELEASE ORAL DAILY
Qty: 4 TABLET | Refills: 0 | Status: SHIPPED | OUTPATIENT
Start: 2018-11-05 | End: 2018-12-07 | Stop reason: SDUPTHER

## 2018-11-05 RX ORDER — POLYETHYLENE GLYCOL 3350 17 G/17G
238 POWDER, FOR SOLUTION ORAL ONCE
Qty: 14 PACKET | Refills: 0 | Status: SHIPPED | OUTPATIENT
Start: 2018-11-05 | End: 2018-11-05

## 2018-11-05 NOTE — PROGRESS NOTES
Patient: Mayuri Watts    YOB: 1965    Date: 11/05/2018    Primary Care Provider: Claude Osorio MD    Chief Complaint   Patient presents with   • Constipation       Subjective .     History of present illness: Patient over the last 6 months or so has had intermittent constipation.  It has not worsened.  She has not taken anything for the constipation.  Also associated with this she has had some mild rectal bleeding.  Mainly on the toilet paper.  Occasionally has some diarrhea.  She denies any associated abdominal pain.  No melena.    The following portions of the patient's history were reviewed and updated as appropriate: allergies, current medications, past family history, past medical history, past social history, past surgical history and problem list.      Review of Systems   Constitutional: Negative for chills, fever and unexpected weight change.   HENT: Negative for hearing loss, trouble swallowing and voice change.    Eyes: Negative for visual disturbance.   Respiratory: Negative for apnea, cough, chest tightness, shortness of breath and wheezing.    Cardiovascular: Negative for chest pain, palpitations and leg swelling.   Gastrointestinal: Positive for anal bleeding and constipation. Negative for abdominal distention, abdominal pain, blood in stool, diarrhea, nausea, rectal pain and vomiting.   Endocrine: Negative for cold intolerance and heat intolerance.   Genitourinary: Negative for difficulty urinating, dysuria and flank pain.   Musculoskeletal: Negative for back pain and gait problem.   Skin: Negative for color change, rash and wound.   Neurological: Negative for dizziness, syncope, speech difficulty, weakness, light-headedness, numbness and headaches.   Hematological: Negative for adenopathy. Does not bruise/bleed easily.   Psychiatric/Behavioral: Negative for confusion. The patient is not nervous/anxious.        History:  Past Medical History:   Diagnosis Date   • Abnormal PFTs     • Anxiety    • Asthma    • Cervical disc disease     PT STATES BULGING DISKS IN NECK, HAS BEEN DOING PHYSICAL THERAPY AND STEROID SHOTS (PAST) FOR THIS   • Chronic back pain     HX OF BACK SURGERY   • Chronic narcotic use    • Depression    • Dysphagia     rare cervical dysphagia for steak   • Elevated LFTs    • Fibromyalgia    • Foot pain     LEFT FOOT PAIN, DUE TO TORN TENDON   • GERD (gastroesophageal reflux disease)    • History of Papanicolaou smear of cervix 2015    NORMAL    • HTN (hypertension)    • Hypercholesteremia    • Hypothyroidism    • Joint pain    • Morbid obesity (CMS/HCC)    •  (normal spontaneous vaginal delivery)     x1 w/o complic   • Osteoarthritis    • Osteoporosis    • Polycythemia    • Psoriasis    • Psoriatic arthritis (CMS/HCC)    • Sleep apnea     CPAP compliant - SET AT 16, RESPIRATORY NOTIFIED.   • Stress incontinence    • Vitamin D deficiency    • Wears glasses        Past Surgical History:   Procedure Laterality Date   • CARDIAC CATHETERIZATION      NO INTERVENTION   • COLONOSCOPY      PT STATES POLYPS REMOVED   • DILATION AND CURETTAGE, DIAGNOSTIC / THERAPEUTIC     • GASTRIC SLEEVE LAPAROSCOPIC N/A 2016    Procedure: GASTRIC SLEEVE LAPAROSCOPIC;  Surgeon: Garrett Palencia MD;  Location: [facility];  Service:    • KNEE ARTHROSCOPY Left    • LAPAROSCOPIC CHOLECYSTECTOMY      no stones. [facility].   • LAPAROSCOPIC TUBAL LIGATION     • LUMBAR FUSION      L4-5 disc and hardware [facility]   • WISDOM TOOTH EXTRACTION         Family History   Problem Relation Age of Onset   • Breast cancer Mother    • Hypertension Mother    • Heart attack Sister    • Hypertension Sister    • Sleep apnea Sister    • Obesity Sister    • Diabetes Sister        Social History   Substance Use Topics   • Smoking status: Current Every Day Smoker     Packs/day: 1.50     Years: 32.00     Types: Cigarettes     Last attempt to quit:    • Smokeless tobacco: Never Used  "     Comment: PT STATES SHE USES THE E-CIGARETTE OCCASIONALLY   • Alcohol use No       Allergies:  Allergies   Allergen Reactions   • Wellbutrin [Bupropion] Rash       Medications:    Current Outpatient Prescriptions:   •  gabapentin (NEURONTIN) 600 MG tablet, , Disp: , Rfl: 0  •  HUMIRA PEN 40 MG/0.8ML Pen-injector Kit, , Disp: , Rfl:   •  HYDROcodone-acetaminophen (NORCO) 5-325 MG per tablet, Take 1 tablet by mouth Every 6 (Six) Hours As Needed for moderate pain (4-6)., Disp: 30 tablet, Rfl: 0  •  levothyroxine (SYNTHROID, LEVOTHROID) 137 MCG tablet, , Disp: , Rfl: 0  •  lisinopril-hydrochlorothiazide (PRINZIDE,ZESTORETIC) 10-12.5 MG per tablet, , Disp: , Rfl: 0  •  meloxicam (MOBIC) 7.5 MG tablet, take 1 tablet by mouth once daily if needed, Disp: , Rfl: 0  •  methocarbamol (ROBAXIN) 750 MG tablet, Take 750 mg by mouth Daily As Needed for muscle spasms., Disp: , Rfl:   •  Multiple Vitamins-Minerals (MULTIVITAMIN ADULT PO), Take 1 tablet by mouth Daily., Disp: , Rfl:   •  omeprazole (priLOSEC) 40 MG capsule, 40 mg Daily. Taking 2 of a morning and 1 at night, Disp: , Rfl:   •  venlafaxine XR (EFFEXOR-XR) 150 MG 24 hr capsule, Take 150 mg by mouth Daily., Disp: , Rfl:   •  bisacodyl (bisacodyl) 5 MG EC tablet, Take 1 tablet by mouth Daily., Disp: 4 tablet, Rfl: 0  •  polyethylene glycol (MIRALAX) packet, Take 238 g by mouth 1 (One) Time for 1 dose., Disp: 14 packet, Rfl: 0    Objective     Vital Signs:   Vitals:    11/05/18 1259   BP: 142/78   Pulse: 85   Temp: 98.3 °F (36.8 °C)   SpO2: 96%   Weight: 96.7 kg (213 lb 3.2 oz)   Height: 165.1 cm (65\")       Physical Exam:   General Appearance:    Alert, cooperative, in no acute distress   Head:    Normocephalic, without obvious abnormality, atraumatic   Eyes:            Normal.  No scleral icterus.  PERRLA    Lungs:     Clear to auscultation,respirations regular, even and                  unlabored    Heart:    Regular rhythm and normal rate, normal S1 and S2, no       "      murmur   Abdomen:     Normal bowel sounds, no masses, no organomegaly, soft        non-tender, non-distended, no guarding,    Extremities:   Moves all extremities well, no edema, no cyanosis, no             redness   Skin:   No bleeding, bruising or rash   Neurologic:   Normal without gross deficits.   Psychiatric: No evidence of depression or anxiety        Results Review:   None    Review of Systems was reviewed and confirmed as accurate today.    Assessment/Plan :    1. Rectal bleed    2. History of colon polyps : Had an adenomatous polyp removed in 2010      I recommend a colonoscopy to further evaluate.  She understands procedure as well as the risks of bleeding and perforation and she understands the ramifications of these potential complications and she wishes to proceed.  Also I recommend a fiber supplement of choice.    Electronically signed by Gold Antoine MD  11/05/18 12:57 PM    Portions of this note has been scribed for Gold Antoine MD by Maty Andrews. 11/5/2018  1:17 PM

## 2018-11-07 RX ORDER — BISACODYL 5 MG/1
5 TABLET, DELAYED RELEASE ORAL DAILY
Qty: 4 TABLET | Refills: 0 | Status: SHIPPED | OUTPATIENT
Start: 2018-11-07 | End: 2021-03-16

## 2018-11-07 RX ORDER — POLYETHYLENE GLYCOL 3350 17 G/17G
238 POWDER, FOR SOLUTION ORAL ONCE
Qty: 14 PACKET | Refills: 0 | Status: SHIPPED | OUTPATIENT
Start: 2018-11-07 | End: 2018-11-07

## 2018-11-12 ENCOUNTER — OFFICE VISIT (OUTPATIENT)
Dept: UROLOGY | Facility: CLINIC | Age: 53
End: 2018-11-12

## 2018-11-12 ENCOUNTER — OUTSIDE FACILITY SERVICE (OUTPATIENT)
Dept: SURGERY | Facility: CLINIC | Age: 53
End: 2018-11-12

## 2018-11-12 VITALS — HEIGHT: 65 IN | BODY MASS INDEX: 35.49 KG/M2 | WEIGHT: 213 LBS

## 2018-11-12 DIAGNOSIS — N39.3 STRESS INCONTINENCE: Primary | ICD-10-CM

## 2018-11-12 LAB
BILIRUB BLD-MCNC: NEGATIVE MG/DL
CLARITY, POC: CLEAR
COLOR UR: YELLOW
GLUCOSE UR STRIP-MCNC: NEGATIVE MG/DL
KETONES UR QL: NEGATIVE
LEUKOCYTE EST, POC: ABNORMAL
NITRITE UR-MCNC: NEGATIVE MG/ML
PH UR: 5.5 [PH] (ref 5–8)
PROT UR STRIP-MCNC: ABNORMAL MG/DL
RBC # UR STRIP: NEGATIVE /UL
SP GR UR: 1.02 (ref 1–1.03)
UROBILINOGEN UR QL: NORMAL

## 2018-11-12 PROCEDURE — 51798 US URINE CAPACITY MEASURE: CPT | Performed by: UROLOGY

## 2018-11-12 PROCEDURE — 99203 OFFICE O/P NEW LOW 30 MIN: CPT | Performed by: UROLOGY

## 2018-11-12 PROCEDURE — 45378 DIAGNOSTIC COLONOSCOPY: CPT | Performed by: SURGERY

## 2018-11-12 PROCEDURE — 81003 URINALYSIS AUTO W/O SCOPE: CPT | Performed by: UROLOGY

## 2018-11-12 RX ORDER — NITROFURANTOIN MACROCRYSTALS 50 MG/1
CAPSULE ORAL
Refills: 0 | COMMUNITY
Start: 2018-10-10 | End: 2019-01-10 | Stop reason: SDUPTHER

## 2018-11-12 NOTE — PROGRESS NOTES
"Chief Complaint:          Chief Complaint   Patient presents with   • Stress Incontience       HPI:   53 y.o. female.  53-year-old white female referred with \"trouble\".  She has frequency.  She gets up 0 times at night.  She has minimal stress incontinence with coughing with leaking and laughing.  She has frequent urinary tract infections that she's been on prophylaxis with Macrobid.  There is urge and urge related incontinence.  She's  1 para 1.  She had a gastric sleeve and she's on several medications that can affect  voiding function.  Her physical exams unremarkable I'm going to set her up for lower tract investigation.    Past Medical History:        Past Medical History:   Diagnosis Date   • Abnormal PFTs    • Anxiety    • Asthma    • Cervical disc disease     PT STATES BULGING DISKS IN NECK, HAS BEEN DOING PHYSICAL THERAPY AND STEROID SHOTS (PAST) FOR THIS   • Chronic back pain     HX OF BACK SURGERY   • Chronic narcotic use    • Depression    • Dysphagia     rare cervical dysphagia for steak   • Elevated LFTs    • Fibromyalgia    • Foot pain     LEFT FOOT PAIN, DUE TO TORN TENDON   • GERD (gastroesophageal reflux disease)    • History of Papanicolaou smear of cervix 2015    NORMAL    • HTN (hypertension)    • Hypercholesteremia    • Hypothyroidism    • Joint pain    • Morbid obesity (CMS/HCC)    •  (normal spontaneous vaginal delivery)     x1 w/o complic   • Osteoarthritis    • Osteoporosis    • Polycythemia    • Psoriasis    • Psoriatic arthritis (CMS/HCC)    • Sleep apnea     CPAP compliant - SET AT 16, RESPIRATORY NOTIFIED.   • Stress incontinence    • Vitamin D deficiency    • Wears glasses          Current Meds:     Current Outpatient Medications   Medication Sig Dispense Refill   • bisacodyl (bisacodyl) 5 MG EC tablet Take 1 tablet by mouth Daily. 4 tablet 0   • bisacodyl (bisacodyl) 5 MG EC tablet Take 1 tablet by mouth Daily. 4 tablet 0   • gabapentin (NEURONTIN) 600 MG tablet   0 "   • HUMIRA PEN 40 MG/0.8ML Pen-injector Kit      • HYDROcodone-acetaminophen (NORCO) 5-325 MG per tablet Take 1 tablet by mouth Every 6 (Six) Hours As Needed for moderate pain (4-6). 30 tablet 0   • levothyroxine (SYNTHROID, LEVOTHROID) 137 MCG tablet   0   • lisinopril-hydrochlorothiazide (PRINZIDE,ZESTORETIC) 10-12.5 MG per tablet   0   • meloxicam (MOBIC) 7.5 MG tablet take 1 tablet by mouth once daily if needed  0   • methocarbamol (ROBAXIN) 750 MG tablet Take 750 mg by mouth Daily As Needed for muscle spasms.     • Multiple Vitamins-Minerals (MULTIVITAMIN ADULT PO) Take 1 tablet by mouth Daily.     • nitrofurantoin (MACRODANTIN) 50 MG capsule take 1 capsule by mouth every 24 hours take with food  0   • omeprazole (priLOSEC) 40 MG capsule 40 mg Daily. Taking 2 of a morning and 1 at night     • venlafaxine XR (EFFEXOR-XR) 150 MG 24 hr capsule Take 150 mg by mouth Daily.       No current facility-administered medications for this visit.         Allergies:      Allergies   Allergen Reactions   • Wellbutrin [Bupropion] Rash        Past Surgical History:     Past Surgical History:   Procedure Laterality Date   • CARDIAC CATHETERIZATION  2012    NO INTERVENTION   • COLONOSCOPY  2009    PT STATES POLYPS REMOVED   • DILATION AND CURETTAGE, DIAGNOSTIC / THERAPEUTIC     • KNEE ARTHROSCOPY Left 2008   • LAPAROSCOPIC CHOLECYSTECTOMY  2006    no stones. [facility].   • LAPAROSCOPIC TUBAL LIGATION  1990   • LUMBAR FUSION  2012    L4-5 disc and hardware [facility]   • WISDOM TOOTH EXTRACTION           Social History:     Social History     Socioeconomic History   • Marital status:      Spouse name: Not on file   • Number of children: Not on file   • Years of education: Not on file   • Highest education level: Not on file   Social Needs   • Financial resource strain: Not on file   • Food insecurity - worry: Not on file   • Food insecurity - inability: Not on file   • Transportation needs - medical: Not on file   •  Transportation needs - non-medical: Not on file   Occupational History   • Not on file   Tobacco Use   • Smoking status: Current Every Day Smoker     Packs/day: 1.50     Years: 32.00     Pack years: 48.00     Types: Cigarettes     Last attempt to quit: 2012     Years since quittin.8   • Smokeless tobacco: Never Used   • Tobacco comment: PT STATES SHE USES THE E-CIGARETTE OCCASIONALLY   Substance and Sexual Activity   • Alcohol use: No   • Drug use: No   • Sexual activity: Yes     Partners: Male   Other Topics Concern   • Not on file   Social History Narrative    Lives in [facility].       Family History:     Family History   Problem Relation Age of Onset   • Breast cancer Mother    • Hypertension Mother    • Heart attack Sister    • Hypertension Sister    • Sleep apnea Sister    • Obesity Sister    • Diabetes Sister        Review of Systems:     Review of Systems   Constitutional: Negative.  Negative for activity change, appetite change, chills, diaphoresis, fatigue, fever and unexpected weight change.   HENT: Negative for congestion, dental problem, drooling, ear discharge, ear pain, facial swelling, hearing loss, mouth sores, nosebleeds, postnasal drip, rhinorrhea, sinus pressure, sneezing, sore throat, tinnitus, trouble swallowing and voice change.    Eyes: Negative.  Negative for photophobia, pain, discharge, redness, itching and visual disturbance.   Respiratory: Positive for cough, shortness of breath and wheezing. Negative for apnea, choking, chest tightness and stridor.    Cardiovascular: Negative.  Negative for chest pain, palpitations and leg swelling.   Gastrointestinal: Negative.  Negative for abdominal distention, abdominal pain, anal bleeding, blood in stool, constipation, diarrhea, nausea, rectal pain and vomiting.   Endocrine: Negative.  Negative for cold intolerance, heat intolerance, polydipsia, polyphagia and polyuria.   Musculoskeletal: Positive for back pain. Negative for arthralgias, gait  problem, joint swelling, myalgias, neck pain and neck stiffness.   Skin: Negative.  Negative for color change, pallor, rash and wound.   Allergic/Immunologic: Negative.  Negative for environmental allergies, food allergies and immunocompromised state.   Neurological: Negative.  Negative for dizziness, tremors, seizures, syncope, facial asymmetry, speech difficulty, weakness, light-headedness, numbness and headaches.   Hematological: Negative.  Negative for adenopathy. Does not bruise/bleed easily.   Psychiatric/Behavioral: Negative for agitation, behavioral problems, confusion, decreased concentration, dysphoric mood, hallucinations, self-injury, sleep disturbance and suicidal ideas. The patient is not nervous/anxious and is not hyperactive.    All other systems reviewed and are negative.      Physical Exam:     Physical Exam   Constitutional: She appears well-developed and well-nourished.   HENT:   Head: Normocephalic and atraumatic.   Right Ear: External ear normal.   Left Ear: External ear normal.   Mouth/Throat: Oropharynx is clear and moist.   Eyes: Conjunctivae are normal. Pupils are equal, round, and reactive to light.   Cardiovascular: Normal rate, regular rhythm, normal heart sounds and intact distal pulses.   Pulmonary/Chest: Effort normal and breath sounds normal.   Abdominal: Soft. Bowel sounds are normal. She exhibits no distension and no mass. There is no tenderness. There is no rebound and no guarding.   Genitourinary: No vaginal discharge found.   Genitourinary Comments: Soft nontender abdomen with no organomegaly, rigidity, guarding or tenderness.  Normal vaginal orifice.  No evidence of prolapse no palpable masses.  No significant perineal body abnormalities and a normal external anus.  Neurologic exam is nonfocal.   Musculoskeletal: Normal range of motion.   Neurological: She is alert. She has normal reflexes.   Skin: Skin is warm and dry.   Psychiatric: She has a normal mood and affect. Her  behavior is normal. Judgment and thought content normal.       I have reviewed the following portions of the patient's history: allergies, current medications, past family history, past medical history, past social history, past surgical history, problem list and ROS and confirm it's accurate.      Procedure:       Assessment/Plan:   Urinary incontinence:  Patient was diagnosed with urinary incontinence.  We discussed treatable and non-treatable causes of both stress and urge urinary incontinence.  With regards to stress urinary incontinence we discussed its relationship to childbirth and pelvic health.  We discussed the grading of stress incontinence with trying to quantitate the number of pads used.  We talked about leaking urine with laughing, lifting, coughing, and sexual intercourse.  Talked about the urge component and the concept of mixed incontinence where upon the stress treatable at the urge may exist and that 50% of the time the urge will resolve with treatment of the stress incontinence.  We talked with the diagnostic workup including a postvoid residual urine, urine and even a simple cystometrogram.  I discussed the findings that may be neurologically related including commonly seen with multiple sclerosis, Parkinson's disease, and stroke.  I talked about the various therapeutic options including anticholinergics, beta 3 agonists, and alpha blockade if there is a component of obstruction.  I discussed the side effects of anti-cholinergic including dry mouth, double vision etc. I'm in a set her up for a workup     Patient's Body mass index is 35.45 kg/m². BMI is above normal parameters. Recommendations include: educational material.          This document has been electronically signed by EDWARD MOYA MD November 12, 2018 2:14 PM

## 2018-12-07 ENCOUNTER — OFFICE VISIT (OUTPATIENT)
Dept: OBSTETRICS AND GYNECOLOGY | Facility: CLINIC | Age: 53
End: 2018-12-07

## 2018-12-07 VITALS
SYSTOLIC BLOOD PRESSURE: 132 MMHG | DIASTOLIC BLOOD PRESSURE: 74 MMHG | HEIGHT: 65 IN | BODY MASS INDEX: 35.65 KG/M2 | WEIGHT: 214 LBS

## 2018-12-07 DIAGNOSIS — N95.2 POSTMENOPAUSAL ATROPHIC VAGINITIS: ICD-10-CM

## 2018-12-07 DIAGNOSIS — N81.11 MIDLINE CYSTOCELE: ICD-10-CM

## 2018-12-07 DIAGNOSIS — N39.0 FREQUENT URINARY TRACT INFECTIONS: ICD-10-CM

## 2018-12-07 DIAGNOSIS — N39.46 MIXED STRESS AND URGE URINARY INCONTINENCE: Primary | ICD-10-CM

## 2018-12-07 PROCEDURE — 99214 OFFICE O/P EST MOD 30 MIN: CPT | Performed by: OBSTETRICS & GYNECOLOGY

## 2018-12-07 RX ORDER — POLYETHYLENE GLYCOL 3350 17 G
POWDER IN PACKET (EA) ORAL
Refills: 0 | Status: ON HOLD | COMMUNITY
Start: 2018-11-07 | End: 2023-03-10

## 2018-12-07 RX ORDER — GABAPENTIN 300 MG/1
CAPSULE ORAL
Refills: 0 | COMMUNITY
Start: 2018-09-12 | End: 2021-03-16

## 2018-12-07 RX ORDER — ESTRADIOL 0.1 MG/G
CREAM VAGINAL
Qty: 1 EACH | Refills: 11 | Status: SHIPPED | OUTPATIENT
Start: 2018-12-07 | End: 2019-01-10

## 2018-12-07 RX ORDER — ALBUTEROL SULFATE 90 UG/1
AEROSOL, METERED RESPIRATORY (INHALATION)
Refills: 0 | COMMUNITY
Start: 2018-11-19

## 2018-12-07 RX ORDER — DESONIDE 0.5 MG/G
CREAM TOPICAL
Refills: 0 | COMMUNITY
Start: 2018-11-29

## 2018-12-07 NOTE — PROGRESS NOTES
Subjective  Chief Complaint   Patient presents with   • Vaginal Prolapse     Patient is 53 y.o.  here for evaluation of prolapse and urinary incontinence.  Pt reports onset/worsening of symptoms over the last 5 months.  Pt reports no changes in activity.  Pt had gastric sleeve 2 years ago.  Pt has lost 140 lbs.  Pt denies any bulge per vagina.  Pt reports having urinary incontinence, predominately urgency and urge incontinence.  Pt saw urologist in [place] and told bladder prolapse.  Pt reports mainly urgency and urge incontinence.  Pt does have occ stress incontinence but reports main issue is the urge.  Pt has also been having frequent UTIs.  Pt reports having urine cultures done with Dr. Osorio.  Pt has had multiple UTIs this year.  Pt reports doing better now that she is on suppression with macrodantin.      History  Past Medical History:   Diagnosis Date   • Abnormal PFTs    • Anxiety    • Asthma    • Cervical disc disease     PT STATES BULGING DISKS IN NECK, HAS BEEN DOING PHYSICAL THERAPY AND STEROID SHOTS (PAST) FOR THIS   • Chronic back pain     HX OF BACK SURGERY   • Chronic narcotic use    • Depression    • Dysphagia     rare cervical dysphagia for steak   • Elevated LFTs    • Fibromyalgia    • Foot pain     LEFT FOOT PAIN, DUE TO TORN TENDON   • GERD (gastroesophageal reflux disease)    • History of Papanicolaou smear of cervix 2015    NORMAL    • HTN (hypertension)    • Hypercholesteremia    • Hypothyroidism    • Joint pain    • Morbid obesity (CMS/HCC)    •  (normal spontaneous vaginal delivery)     x1 w/o complic   • Osteoarthritis    • Osteoporosis    • Pelvic prolapse    • Polycythemia    • Psoriasis    • Psoriatic arthritis (CMS/HCC)    • Sleep apnea     CPAP compliant - SET AT 16, RESPIRATORY NOTIFIED.   • Stress incontinence    • Urinary incontinence    • Vitamin D deficiency    • Wears glasses      Current Outpatient Medications on File Prior to Visit   Medication Sig Dispense  Refill   • bisacodyl (bisacodyl) 5 MG EC tablet Take 1 tablet by mouth Daily. 4 tablet 0   • desonide (DESOWEN) 0.05 % cream   0   • gabapentin (NEURONTIN) 300 MG capsule   0   • gabapentin (NEURONTIN) 600 MG tablet   0   • HUMIRA PEN 40 MG/0.8ML Pen-injector Kit      • HYDROcodone-acetaminophen (NORCO) 5-325 MG per tablet Take 1 tablet by mouth Every 6 (Six) Hours As Needed for moderate pain (4-6). 30 tablet 0   • levothyroxine (SYNTHROID, LEVOTHROID) 137 MCG tablet   0   • lisinopril-hydrochlorothiazide (PRINZIDE,ZESTORETIC) 10-12.5 MG per tablet   0   • meloxicam (MOBIC) 7.5 MG tablet take 1 tablet by mouth once daily if needed  0   • methocarbamol (ROBAXIN) 750 MG tablet Take 750 mg by mouth Daily As Needed for muscle spasms.     • Multiple Vitamins-Minerals (MULTIVITAMIN ADULT PO) Take 1 tablet by mouth Daily.     • nitrofurantoin (MACRODANTIN) 50 MG capsule take 1 capsule by mouth every 24 hours take with food  0   • omeprazole (priLOSEC) 40 MG capsule 40 mg Daily. Taking 2 of a morning and 1 at night     • RA LAXATIVE powder TAKE 238 GM BY MOUTH ONE TIME FOR ONE DOSE  0   • venlafaxine XR (EFFEXOR-XR) 150 MG 24 hr capsule Take 150 mg by mouth Daily.     • VENTOLIN  (90 Base) MCG/ACT inhaler inhale 2 puffs by mouth every 4 to 6 hours if needed  0   • [DISCONTINUED] bisacodyl (bisacodyl) 5 MG EC tablet Take 1 tablet by mouth Daily. 4 tablet 0     No current facility-administered medications on file prior to visit.      Allergies   Allergen Reactions   • Wellbutrin [Bupropion] Rash     Past Surgical History:   Procedure Laterality Date   • CARDIAC CATHETERIZATION  2012    NO INTERVENTION   • COLONOSCOPY  2009    PT STATES POLYPS REMOVED   • DILATION AND CURETTAGE, DIAGNOSTIC / THERAPEUTIC     • KNEE ARTHROSCOPY Left 2008   • LAPAROSCOPIC CHOLECYSTECTOMY  2006    no stones. [place].   • LAPAROSCOPIC TUBAL LIGATION  1990   • LUMBAR FUSION  2012    L4-5 disc and hardware [facility]   • WISDOM TOOTH  "EXTRACTION       Family History   Problem Relation Age of Onset   • Breast cancer Mother    • Hypertension Mother    • Heart attack Sister    • Hypertension Sister    • Sleep apnea Sister    • Obesity Sister    • Diabetes Sister      Social History     Socioeconomic History   • Marital status:      Spouse name: Not on file   • Number of children: Not on file   • Years of education: Not on file   • Highest education level: Not on file   Tobacco Use   • Smoking status: Current Every Day Smoker     Packs/day: 1.50     Years: 32.00     Pack years: 48.00     Types: Cigarettes     Last attempt to quit:      Years since quittin.9   • Smokeless tobacco: Never Used   • Tobacco comment: PT STATES SHE USES THE E-CIGARETTE OCCASIONALLY   Substance and Sexual Activity   • Alcohol use: No   • Drug use: No   • Sexual activity: Yes     Partners: Male   Social History Narrative    Lives in [facility].     Review of Systems  All systems were reviewed and negative except for:  Genitourinary: postivie for  urinary incontinence     Objective  Vitals:    18 1524   BP: 132/74   Weight: 97.1 kg (214 lb)   Height: 165.1 cm (65\")     Physical Exam:  General Appearance: alert, appears stated age and cooperative  Head: normocephalic, without obvious abnormality and atraumatic  Eyes: lids and lashes normal, conjunctivae and sclerae normal, no icterus, no pallor, corneas clear and PERRLA  Ears: ears appear intact with no abnormalities noted  Nose: nares normal, septum midline, mucosa normal and no drainage  Neck: suppple, trachea midline and no thyromegaly  Lungs: clear to auscultation, respirations regular, respirations even and respirations unlabored  Heart: regular rhythm and normal rate, normal S1, S2, no murmur, gallop, or rubs and no click  Breasts: Not performed.  Abdomen: normal bowel sounds, no masses, no hepatomegaly, no splenomegaly, soft non-tender, no guarding and no rebound tenderness  Pelvic: Clinical staff " was present for exam  External genitalia:  normal appearance of the external genitalia including Bartholin's and [place]'s glands.  :  urethral meatus normal;  Vaginal:  atrophic mucosal changes are present;  Cervix:  normal appearance.  Uterus:  normal size, shape and consistency.  Adnexa:  non palpable bilaterally.  Cystocele GRADE 3  Extremities: moves extremities well, no edema, no cyanosis and no redness  Skin: no bleeding, bruising or rash and no lesions noted  Lymph Nodes: no palpable adenopathy  Neuro: CN II-X grossly intact; sensation intact  Psych: normal mood and affect, oriented to person, time and place, thought content organized and appropriate judgment  Lab Review   No data reviewed    Imaging   No data reviewed    Assessment/Plan  Problem List Items Addressed This Visit     None      Visit Diagnoses     Mixed stress and urge urinary incontinence    -  Primary New  Long discussion with patient regarding various treatment options.  Pt with predominately urge incontinence per history.  Plan trial with following medication.  Pt also given estrace cream to use intravaginal as well as periurethrael.  Instructions and precautions given.  Pt to f/u in 4 weeks.    Relevant Medications    Mirabegron ER (MYRBETRIQ) 25 MG tablet sustained-release 24 hour 24 hr tablet    estradiol (ESTRACE VAGINAL) 0.1 MG/GM vaginal cream    Postmenopausal atrophic vaginitis    New  Discussed various options for relief of atrophic vaginal symptoms related to menopause. Discussed local therapy for treatment of vaginal symptoms only.  Discussed the different formulation options including cream, ring, and tablets.  Discussed the low risk of systemic absorption in postmenopausal women with atrophy using 25 mcgs of estradiol on a daily basis.  Recommend low dose use 2-3x/wk for maintenance of treatment.  Other treatment options were discussed including the use of water-based and silicone-based vaginal lubricants and moisturizers.   Also discussed was the FDA approved treatment option of ospemifene for moderate to severe dyspareunia.  Rx given as noted.  Instructions and precautions given.    Relevant Medications    estradiol (ESTRACE VAGINAL) 0.1 MG/GM vaginal cream    Midline cystocele    New  Pt with pelvic organ prolapse.  Risks factors discussed including increasing parity, advancing age, obesity, history of hysterectomy, and chronic constipation.  Signs and symptoms discussed.  Treatment options discussed as well including expectant management, pelvic floor muscle exercises, pessary, medication, and surgery.  Changes in lifestyle discussed including no heavy lifting or straining, keep stools soft, and avoid increasing pressure in the area of prolapse.  Pelvic floor exercises were also discussed and reviewed.  Benefits and complications of wearing a pessary was discussed.  Estrogen therapy to strengthen the supporting structures and possibly ameliorate symptoms was discussed.  Surgical intervention with risks, complications, and benefits was also discussed.  Rx estrogen cream given.  F/U as discussed.    Relevant Medications    Mirabegron ER (MYRBETRIQ) 25 MG tablet sustained-release 24 hour 24 hr tablet    estradiol (ESTRACE VAGINAL) 0.1 MG/GM vaginal cream    Frequent urinary tract infections    New  See plan above.  Cont macrobid.  Obtain copy of recent urine cultures.        Follow up as discussed/scheduled  This note was electronically signed.  Pita Hooker M.D.

## 2018-12-10 ENCOUNTER — TELEPHONE (OUTPATIENT)
Dept: OBSTETRICS AND GYNECOLOGY | Facility: CLINIC | Age: 53
End: 2018-12-10

## 2018-12-12 RX ORDER — SOLIFENACIN SUCCINATE 10 MG/1
10 TABLET, FILM COATED ORAL DAILY
Qty: 30 TABLET | Refills: 11 | Status: SHIPPED | OUTPATIENT
Start: 2018-12-12 | End: 2019-01-10

## 2018-12-18 RX ORDER — OXYBUTYNIN CHLORIDE 10 MG/1
10 TABLET, EXTENDED RELEASE ORAL DAILY
Qty: 30 TABLET | Refills: 11 | Status: SHIPPED | OUTPATIENT
Start: 2018-12-18 | End: 2019-12-18

## 2019-01-10 ENCOUNTER — OFFICE VISIT (OUTPATIENT)
Dept: OBSTETRICS AND GYNECOLOGY | Facility: CLINIC | Age: 54
End: 2019-01-10

## 2019-01-10 VITALS — WEIGHT: 216 LBS | BODY MASS INDEX: 35.94 KG/M2 | DIASTOLIC BLOOD PRESSURE: 76 MMHG | SYSTOLIC BLOOD PRESSURE: 140 MMHG

## 2019-01-10 DIAGNOSIS — N39.46 MIXED STRESS AND URGE URINARY INCONTINENCE: Primary | ICD-10-CM

## 2019-01-10 DIAGNOSIS — N39.0 FREQUENT URINARY TRACT INFECTIONS: ICD-10-CM

## 2019-01-10 DIAGNOSIS — R39.89 POSSIBLE URINARY TRACT INFECTION: ICD-10-CM

## 2019-01-10 DIAGNOSIS — N81.11 MIDLINE CYSTOCELE: ICD-10-CM

## 2019-01-10 DIAGNOSIS — N95.2 POSTMENOPAUSAL ATROPHIC VAGINITIS: ICD-10-CM

## 2019-01-10 PROCEDURE — 99214 OFFICE O/P EST MOD 30 MIN: CPT | Performed by: OBSTETRICS & GYNECOLOGY

## 2019-01-10 RX ORDER — NITROFURANTOIN MACROCRYSTALS 50 MG/1
50 CAPSULE ORAL NIGHTLY
Qty: 30 CAPSULE | Refills: 6 | Status: SHIPPED | OUTPATIENT
Start: 2019-01-10 | End: 2019-05-20 | Stop reason: SDUPTHER

## 2019-01-10 RX ORDER — PANTOPRAZOLE SODIUM 40 MG/1
40 TABLET, DELAYED RELEASE ORAL DAILY
Refills: 0 | COMMUNITY
Start: 2018-12-17

## 2019-01-10 NOTE — PROGRESS NOTES
Subjective  Chief Complaint   Patient presents with   • Follow-up     Follow up vaginal atrophy, and prolapse. Patient advised no improvement in symptoms, was unable to get cream from pharmacy due to insurance.      Patient is 53 y.o.  here for evaluation and follow-up of her vaginal prolapse as well as atrophy and incontinence.  The patient's insurance would not cover the Myrbetriq medication.  Her insurance would also not cover the Vesicare.  The patient has been on Ditropan for at least the last 2 weeks.  She reports no improvement whatsoever with her urinary incontinence.  The patient did not get her vaginal estrogen cream field.  Therefore she's had no improvement in her vaginal atrophy.  The patient has continued on her oral antibiotics for treatment of her frequent urinary tract infections.  The patient reports improvement with those symptoms.  She desires to continue the current medication.  She denies any dysuria, fever, chills, or back pain.  The patient continues to report pressure and bulge per vagina.  The patient desires further evaluation and treatment.    History  Past Medical History:   Diagnosis Date   • Abnormal PFTs    • Anxiety    • Asthma    • Cervical disc disease     PT STATES BULGING DISKS IN NECK, HAS BEEN DOING PHYSICAL THERAPY AND STEROID SHOTS (PAST) FOR THIS   • Chronic back pain     HX OF BACK SURGERY   • Chronic narcotic use    • Depression    • Dysphagia     rare cervical dysphagia for steak   • Elevated LFTs    • Fibromyalgia    • Foot pain     LEFT FOOT PAIN, DUE TO TORN TENDON   • GERD (gastroesophageal reflux disease)    • History of Papanicolaou smear of cervix 2015    NORMAL    • HTN (hypertension)    • Hypercholesteremia    • Hypothyroidism    • Joint pain    • Morbid obesity (CMS/HCC)    •  (normal spontaneous vaginal delivery)     x1 w/o complic   • Osteoarthritis    • Osteoporosis    • Pelvic prolapse    • Polycythemia    • Psoriasis    • Psoriatic  arthritis (CMS/[facility])    • Sleep apnea     CPAP compliant - SET AT 16, RESPIRATORY NOTIFIED.   • Stress incontinence    • Urinary incontinence    • Vitamin D deficiency    • Wears glasses      Current Outpatient Medications on File Prior to Visit   Medication Sig Dispense Refill   • bisacodyl (bisacodyl) 5 MG EC tablet Take 1 tablet by mouth Daily. 4 tablet 0   • desonide (DESOWEN) 0.05 % cream   0   • gabapentin (NEURONTIN) 300 MG capsule   0   • gabapentin (NEURONTIN) 600 MG tablet   0   • HUMIRA PEN 40 MG/0.8ML Pen-injector Kit      • HYDROcodone-acetaminophen (NORCO) 5-325 MG per tablet Take 1 tablet by mouth Every 6 (Six) Hours As Needed for moderate pain (4-6). 30 tablet 0   • levothyroxine (SYNTHROID, LEVOTHROID) 137 MCG tablet   0   • lisinopril-hydrochlorothiazide (PRINZIDE,ZESTORETIC) 10-12.5 MG per tablet   0   • meloxicam (MOBIC) 7.5 MG tablet take 1 tablet by mouth once daily if needed  0   • methocarbamol (ROBAXIN) 750 MG tablet Take 750 mg by mouth Daily As Needed for muscle spasms.     • Multiple Vitamins-Minerals (MULTIVITAMIN ADULT PO) Take 1 tablet by mouth Daily.     • omeprazole (priLOSEC) 40 MG capsule 40 mg Daily. Taking 2 of a morning and 1 at night     • oxybutynin XL (DITROPAN XL) 10 MG 24 hr tablet Take 1 tablet by mouth Daily. 30 tablet 11   • pantoprazole (PROTONIX) 40 MG EC tablet Take 40 mg by mouth Daily.  0   • RA LAXATIVE powder TAKE 238 GM BY MOUTH ONE TIME FOR ONE DOSE  0   • venlafaxine XR (EFFEXOR-XR) 150 MG 24 hr capsule Take 150 mg by mouth Daily.     • VENTOLIN  (90 Base) MCG/ACT inhaler inhale 2 puffs by mouth every 4 to 6 hours if needed  0   • [DISCONTINUED] nitrofurantoin (MACRODANTIN) 50 MG capsule take 1 capsule by mouth every 24 hours take with food  0   • [DISCONTINUED] solifenacin (VESICARE) 10 MG tablet Take 1 tablet by mouth Daily. 30 tablet 11   • [DISCONTINUED] estradiol (ESTRACE VAGINAL) 0.1 MG/GM vaginal cream Insert 1 gm intravaginally 1-3 times each week  1 each 11     No current facility-administered medications on file prior to visit.      Allergies   Allergen Reactions   • Wellbutrin [Bupropion] Rash     Past Surgical History:   Procedure Laterality Date   • CARDIAC CATHETERIZATION      NO INTERVENTION   • COLONOSCOPY      PT STATES POLYPS REMOVED   • DILATION AND CURETTAGE, DIAGNOSTIC / THERAPEUTIC     • GASTRIC SLEEVE LAPAROSCOPIC N/A 2016    Procedure: GASTRIC SLEEVE LAPAROSCOPIC;  Surgeon: Garrett Palencia MD;  Location: [facility];  Service:    • KNEE ARTHROSCOPY Left    • LAPAROSCOPIC CHOLECYSTECTOMY      no stones. [facility].   • LAPAROSCOPIC TUBAL LIGATION     • LUMBAR FUSION      L4-5 disc and hardware [facility]   • WISDOM TOOTH EXTRACTION       Family History   Problem Relation Age of Onset   • Breast cancer Mother    • Hypertension Mother    • Heart attack Sister    • Hypertension Sister    • Sleep apnea Sister    • Obesity Sister    • Diabetes Sister      Social History     Socioeconomic History   • Marital status:      Spouse name: Not on file   • Number of children: Not on file   • Years of education: Not on file   • Highest education level: Not on file   Tobacco Use   • Smoking status: Current Every Day Smoker     Packs/day: 1.50     Years: 32.00     Pack years: 48.00     Types: Cigarettes     Last attempt to quit: 2012     Years since quittin.0   • Smokeless tobacco: Never Used   • Tobacco comment: PT STATES SHE USES THE E-CIGARETTE OCCASIONALLY   Substance and Sexual Activity   • Alcohol use: No   • Drug use: No   • Sexual activity: Yes     Partners: Male   Social History Narrative    Lives in [facility].     Review of Systems  The following systems were reviewed and negative:  constitution, eyes, ENT, respiratory, cardiovascular, gastrointestinal, genitourinary, integument, breast, hematologic / lymphatic, musculoskeletal, neurological, behavioral/psych, endocrine and allergies / immunologic      Objective  Vitals:    01/10/19 1426   BP: 140/76   Weight: 98 kg (216 lb)     Physical Exam:  General Appearance: alert, appears stated age and cooperative  Head: normocephalic, without obvious abnormality and atraumatic  Eyes: lids and lashes normal, conjunctivae and sclerae normal, no icterus, no pallor, corneas clear and PERRLA  Ears: ears appear intact with no abnormalities noted  Nose: nares normal, septum midline, mucosa normal and no drainage  Neck: suppple, trachea midline and no thyromegaly  Lungs: clear to auscultation, respirations regular, respirations even and respirations unlabored  Heart: regular rhythm and normal rate, normal S1, S2, no murmur, gallop, or rubs and no click  Breasts: Not performed.  Abdomen: normal bowel sounds, no masses, no hepatomegaly, no splenomegaly, soft non-tender, no guarding and no rebound tenderness  Pelvic: Clinical staff was present for exam  External genitalia:  normal appearance of the external genitalia including Bartholin's and [place]'s glands.  :  urethral meatus normal;  Vaginal:  atrophic mucosal changes are present;  Cervix:  absent.  Uterus:  absent.  Adnexa:  non palpable bilaterally.  Cystocele GRADE 3  Extremities: moves extremities well, no edema, no cyanosis and no redness  Skin: no bleeding, bruising or rash and no lesions noted  Lymph Nodes: no palpable adenopathy  Neuro: CN II-X grossly intact; sensation intact  Psych: normal mood and affect, oriented to person, time and place, thought content organized and appropriate judgment  Lab Review   No data reviewed    Imaging   No data reviewed    Assessment/Plan  Problem List Items Addressed This Visit     None      Visit Diagnoses     Mixed stress and urge urinary incontinence    -  Primary  Mayuri Watts was informed that urinary incontinence is the involuntary leaking of urine caused by a wide variety of factors.  It is a common condition in women that increases with age.  The patient was informed that  there are different types of urinary incontinence to include stress urinary incontinence, urgency urinary incontinence, and mixed urinary incontinence as well as other subtypes.  The patient was informed that the correct diagnosis is important in the evaluation and treatment of her leaking.  The basic evaluation includes patient's history and physical examination.  A urinary tract infection needs to be ruled out as well as urinary retention and overflow incontinence.  Sometimes additional specialized testing needs to be performed such as urodynamic testing and cystoscopy.  The various treatment options were discussed ranging from conservative  treatment to include pelvic floor exercises and modifications in lifestyle, pessary, pharmacologic, to surgical.  Locally administered estrogen may be of some benefit in women with vaginal atrophy. Because treatment options vary by incontinence type and effectiveness, it is important to determine the etiology and severity of symptoms.  Plan obtain CCUA, C&S today; schedule urodynamic testing for further evaluation.    Relevant Orders    Cystometrogram    Urine Culture - Urine, Urine, Clean Catch    Urinalysis With Microscopic - Urine, Clean Catch    Possible urinary tract infection    New  Will send urine as noted.  Rx Macrodantin given to continue prophylaxis.    Relevant Orders    Urine Culture - Urine, Urine, Clean Catch    Urinalysis With Microscopic - Urine, Clean Catch    Postmenopausal atrophic vaginitis    Unchanged  Samples of premarin cream given.  Instructions and precautions given.    Midline cystocele    Unchanged  Will schedule urodynamic testing.  Plan pending results.    Relevant Medications    nitrofurantoin (MACRODANTIN) 50 MG capsule    Other Relevant Orders    Cystometrogram    Frequent urinary tract infections    Unchanged  Will check urine as noted.  Continue current medication.    Relevant Medications    nitrofurantoin (MACRODANTIN) 50 MG capsule     Other Relevant Orders    Urine Culture - Urine, Urine, Clean Catch    Urinalysis With Microscopic - Urine, Clean Catch        Follow up as discussed/scheduled  This note was electronically signed.  Pita Hooker M.D.

## 2019-01-11 LAB
APPEARANCE UR: CLEAR
BACTERIA #/AREA URNS HPF: ABNORMAL /HPF
BACTERIA UR CULT: NO GROWTH
BACTERIA UR CULT: NORMAL
BILIRUB UR QL STRIP: NEGATIVE
COLOR UR: YELLOW
EPI CELLS #/AREA URNS HPF: ABNORMAL /HPF
GLUCOSE UR QL: NEGATIVE
HGB UR QL STRIP: NEGATIVE
KETONES UR QL STRIP: NEGATIVE
LEUKOCYTE ESTERASE UR QL STRIP: NEGATIVE
MUCOUS THREADS URNS QL MICRO: ABNORMAL /HPF
NITRITE UR QL STRIP: NEGATIVE
PH UR STRIP: 7.5 [PH] (ref 5–8)
PROT UR QL STRIP: (no result)
RBC #/AREA URNS HPF: ABNORMAL /HPF
SP GR UR: 1.02 (ref 1–1.03)
UROBILINOGEN UR STRIP-MCNC: (no result) MG/DL
WBC #/AREA URNS HPF: ABNORMAL /HPF

## 2019-02-08 ENCOUNTER — CLINICAL SUPPORT (OUTPATIENT)
Dept: OBSTETRICS AND GYNECOLOGY | Facility: CLINIC | Age: 54
End: 2019-02-08

## 2019-02-08 VITALS
WEIGHT: 216 LBS | DIASTOLIC BLOOD PRESSURE: 74 MMHG | HEIGHT: 65 IN | BODY MASS INDEX: 35.99 KG/M2 | SYSTOLIC BLOOD PRESSURE: 138 MMHG

## 2019-02-08 DIAGNOSIS — N39.46 MIXED STRESS AND URGE URINARY INCONTINENCE: Primary | ICD-10-CM

## 2019-02-08 DIAGNOSIS — N81.11 MIDLINE CYSTOCELE: ICD-10-CM

## 2019-02-08 PROCEDURE — 51729 CYSTOMETROGRAM W/VP&UP: CPT | Performed by: OBSTETRICS & GYNECOLOGY

## 2019-02-08 PROCEDURE — 51797 INTRAABDOMINAL PRESSURE TEST: CPT | Performed by: OBSTETRICS & GYNECOLOGY

## 2019-02-08 PROCEDURE — 51741 ELECTRO-UROFLOWMETRY FIRST: CPT | Performed by: OBSTETRICS & GYNECOLOGY

## 2019-02-08 NOTE — PROGRESS NOTES
PATIENT TOLERATED WELL, PATIENT TO SCHEDULE FOLLOW UP APPOINTMENT TO DISCUSS RESULTS, SEE REPORT.

## 2019-05-20 DIAGNOSIS — N39.0 FREQUENT URINARY TRACT INFECTIONS: ICD-10-CM

## 2019-05-22 RX ORDER — NITROFURANTOIN MACROCRYSTALS 50 MG/1
CAPSULE ORAL
Qty: 150 CAPSULE | Refills: 0 | Status: SHIPPED | OUTPATIENT
Start: 2019-05-22 | End: 2021-03-16

## 2019-10-04 ENCOUNTER — TELEPHONE (OUTPATIENT)
Dept: OBSTETRICS AND GYNECOLOGY | Facility: CLINIC | Age: 54
End: 2019-10-04

## 2019-10-04 NOTE — TELEPHONE ENCOUNTER
Patient is in [place] and experiencing symptoms of a UTI.  Advised her to go to [facility] to have urine checked.

## 2019-10-04 NOTE — TELEPHONE ENCOUNTER
----- Message from Jacqueline Delatorre sent at 10/4/2019  2:18 PM EDT -----  Contact: PT  THIS IS DR ALANIS'S PT.  SHE CALLED WANTING TO SPEAK WITH A CMA.  PLEASE CALL HER [phone].  THANKS

## 2020-05-12 ENCOUNTER — TELEPHONE (OUTPATIENT)
Dept: OBSTETRICS AND GYNECOLOGY | Facility: CLINIC | Age: 55
End: 2020-05-12

## 2020-05-12 NOTE — TELEPHONE ENCOUNTER
----- Message from Jacqueline Delatorre sent at 5/12/2020  3:51 PM EDT -----  Contact: PT  THIS IS DR ALANIS'S PT.   SHE CALLED REQUESTING REFILLS ON DITROPAN XL.  SHE USES [facility] IN [facility].  THANKS

## 2020-05-13 RX ORDER — OXYBUTYNIN CHLORIDE 10 MG/1
10 TABLET, EXTENDED RELEASE ORAL DAILY
Qty: 30 TABLET | Refills: 2 | Status: SHIPPED | OUTPATIENT
Start: 2020-05-13 | End: 2020-08-17

## 2020-08-17 RX ORDER — OXYBUTYNIN CHLORIDE 10 MG/1
10 TABLET, EXTENDED RELEASE ORAL DAILY
Qty: 30 TABLET | Refills: 2 | Status: SHIPPED | OUTPATIENT
Start: 2020-08-17 | End: 2021-03-16

## 2021-03-16 ENCOUNTER — OFFICE VISIT (OUTPATIENT)
Dept: OBSTETRICS AND GYNECOLOGY | Facility: CLINIC | Age: 56
End: 2021-03-16

## 2021-03-16 VITALS
WEIGHT: 214 LBS | BODY MASS INDEX: 34.39 KG/M2 | SYSTOLIC BLOOD PRESSURE: 130 MMHG | HEIGHT: 66 IN | DIASTOLIC BLOOD PRESSURE: 70 MMHG

## 2021-03-16 DIAGNOSIS — Z01.419 ENCOUNTER FOR GYNECOLOGICAL EXAMINATION (GENERAL) (ROUTINE) WITHOUT ABNORMAL FINDINGS: Primary | ICD-10-CM

## 2021-03-16 DIAGNOSIS — N39.46 MIXED STRESS AND URGE URINARY INCONTINENCE: ICD-10-CM

## 2021-03-16 DIAGNOSIS — Z12.31 ENCOUNTER FOR SCREENING MAMMOGRAM FOR BREAST CANCER: ICD-10-CM

## 2021-03-16 DIAGNOSIS — N81.11 CYSTOCELE, MIDLINE: ICD-10-CM

## 2021-03-16 DIAGNOSIS — N39.0 FREQUENT URINARY TRACT INFECTIONS: ICD-10-CM

## 2021-03-16 DIAGNOSIS — N95.2 POSTMENOPAUSAL ATROPHIC VAGINITIS: ICD-10-CM

## 2021-03-16 PROCEDURE — 99214 OFFICE O/P EST MOD 30 MIN: CPT | Performed by: OBSTETRICS & GYNECOLOGY

## 2021-03-16 RX ORDER — DICLOFENAC SODIUM 75 MG/1
75 TABLET, DELAYED RELEASE ORAL 2 TIMES DAILY
Status: ON HOLD | COMMUNITY
Start: 2021-02-02 | End: 2023-03-10

## 2021-03-16 RX ORDER — CONJUGATED ESTROGENS 0.62 MG/G
CREAM VAGINAL
Qty: 1 EACH | Refills: 11 | Status: SHIPPED | OUTPATIENT
Start: 2021-03-16

## 2021-03-16 RX ORDER — CYCLOBENZAPRINE HCL 5 MG
5 TABLET ORAL 3 TIMES DAILY PRN
COMMUNITY
Start: 2021-01-25 | End: 2023-03-28

## 2021-03-16 NOTE — PROGRESS NOTES
"Chief Complaint  Gynecologic Exam (Patient complains of urinary incontinence. )     History of Present Illness:  Patient is 55 y.o.  who presents to [facility] for her annual examination with complaints of urinary incontinence and recurrent urinary tract infections.  The patient has not been seen since .  The patient did have urodynamic testing in 2019.  The patient did not have any follow-up since that time.  Her urodynamic testing is reviewed today with the patient.  Her postvoid residual was normal.  The Valsalva leak point pressure was consistent with genuine stress urinary incontinence.  Her bladder capacity was reduced to less than 300 mL.  Her maximal urethral closure pressure was normal with no evidence of intrinsic sphincter deficiency.  The patient has been on oxybutynin.  She reports she ran out of this approximately 1 month ago.  Patient does report having worsening of her urinary incontinence.  The patient will have urgency as well as urge incontinence.  The patient does report at times she will have to strain and change positions in order to empty her bladder.  Patient denies any problems with her bowel movements.  The patient has had frequent urinary tract infections.  The patient has been living in [place] for the majority of the year.  The patient reports she has a mammogram scheduled for this Thursday and .  The patient has not been on any hormone replacement therapy.  She has not been on any estrogen cream either.  The patient does report having vaginal dryness.    Physical Examination:  Vital Signs: /70   Ht 167.6 cm (66\")   Wt 97.1 kg (214 lb)   BMI 34.54 kg/m²     General Appearance: alert, appears stated age, and cooperative  Breasts: Examined in supine position  Symmetric without masses or skin dimpling  Nipples normal without inversion, lesions or discharge  There are no palpable axillary nodes  Abdomen: no masses, no hepatomegaly, " no splenomegaly, soft non-tender, no guarding and no rebound tenderness  Pelvic: Clinical staff was present for exam  External genitalia:  normal appearance of the external genitalia including Bartholin's and [place]'s glands.  :  urethral meatus normal;  Vaginal:  atrophic mucosal changes are present;  Cervix:  absent.  Uterus:  absent.  Adnexa:  non palpable bilaterally.  Cystocele GRADE 3  Pap smear done and specimen sent using Thin-Prep technique    Data Review:  The following data was reviewed by: Pita Hooker MD on 03/16/2021:     Labs:    Imaging:  LABORATORY - SCAN - URODYNAMIC TESTING,2/8/19 (02/08/2019)    Medical Records:  None    Assessment and Plan   Problem List Items Addressed This Visit        Genitourinary and Reproductive     Mixed stress and urge urinary incontinence  We will plan a trial with Myrbetriq's as noted.  Instructions and precautions are given.  Patient is to call if no improvement in her symptoms in 4 to 6 weeks.  Prescription is also given for estrogen cream as noted.    Relevant Medications    Mirabegron ER (Myrbetriq) 50 MG tablet sustained-release 24 hour 24 hr tablet    conjugated estrogens (Premarin) 0.625 MG/GM vaginal cream      Other Visit Diagnoses     Encounter for gynecological examination (general) (routine) without abnormal findings    -  Primary  Pap was done today.  If she does not receive the results of the Pap within 2 weeks  time, she was instructed to call to find out the results.  I explained to Mayuri that the recommendations for Pap smear interval in a low risk patient has lengthened to 3 years time if cytology alone normal or  5 years time if both cytology and HPV testing were normal.  I encouraged her to be seen yearly for a full physical exam including breast and pelvic exam even during the off years when PAP's will not be performed.    Relevant Orders    Liquid-based Pap Smear, Screening    Encounter for screening mammogram for breast cancer      It is  recommended per ACOG, for women at average risk to start annual mammogram screening at the age of 40 until the age of 75 and an individualized decision be made for women after age 75.  She was encouraged to continue getting yearly mammograms.  She should report any palpable breast lump(s) or skin changes regardless of mammographic findings.  I explained to Mayuri that notification regarding her mammogram results will come from the center performing the study.  Our office will not be routinely calling with mammogram results.  It is her responsibility to make sure that the results from the mammogram are communicated to her by the breast center.  If she has any questions about the results, she is welcome to call our office anytime.  The patient reports she Has mammogram scheduled.    Mayuri was counseled regarding having clinical breast exams and breast self-awareness.  Women aged 29-39 years of age should have clinical breast exams every 1-3 years and yearly aged 40 and older.  The patient was counseled regarding breast self-awareness focusing on having a sense of what is normal for her breasts so that she can tell if there are changes.  Even small changes should be reported to provider.    Cystocele, midline      There are no changes noted on examination.  I discussed with the patient at length the various treatment options.  Instructions and precautions have been given.  Patient is to call if worsening of her symptoms.    Relevant Medications    Mirabegron ER (Myrbetriq) 50 MG tablet sustained-release 24 hour 24 hr tablet    conjugated estrogens (Premarin) 0.625 MG/GM vaginal cream    Frequent urinary tract infections      Patient with frequent urinary tract infections as noted.  Patient with atrophic changes.  Prescription is given for estrogen cream to apply to the periurethral area.  Instructions and precautions are given.  Patient is to call if no improvement in her symptoms.    Relevant Medications     conjugated estrogens (Premarin) 0.625 MG/GM vaginal cream    Postmenopausal atrophic vaginitis      Discussed various options for relief of atrophic vaginal symptoms related to menopause. Discussed local therapy for treatment of vaginal symptoms only.  Discussed the different formulation options including cream, ring, and tablets.  Discussed the low risk of systemic absorption in postmenopausal women with atrophy using 25 mcgs of estradiol on a daily basis.  Recommend low dose use 2-3x/wk for maintenance of treatment.  Other treatment options were discussed including the use of water-based and silicone-based vaginal lubricants and moisturizers.  Also discussed was the FDA approved treatment option of ospemifene for moderate to severe dyspareunia.    Relevant Medications    conjugated estrogens (Premarin) 0.625 MG/GM vaginal cream          Follow Up/Instructions:    Patient was given instructions and counseling regarding her condition or for health maintenance advice. Please see specific information pulled into the AVS if appropriate.     Note: Speech recognition transcription software may have been used to dictate portions of this document.  An attempt at proofreading has been made though minor errors in transcription may still be present.    This note was electronically signed.  Pita Hooker M.D.

## 2021-03-25 DIAGNOSIS — Z01.419 ENCOUNTER FOR GYNECOLOGICAL EXAMINATION (GENERAL) (ROUTINE) WITHOUT ABNORMAL FINDINGS: ICD-10-CM

## 2021-09-21 RX ORDER — ESTRADIOL 0.1 MG/G
CREAM VAGINAL
Qty: 42.5 G | Refills: 0 | Status: ON HOLD | OUTPATIENT
Start: 2021-09-21 | End: 2023-03-10

## 2022-02-15 DIAGNOSIS — N39.46 MIXED STRESS AND URGE URINARY INCONTINENCE: ICD-10-CM

## 2023-02-15 ENCOUNTER — TELEPHONE (OUTPATIENT)
Dept: SURGERY | Facility: CLINIC | Age: 58
End: 2023-02-15
Payer: MEDICARE

## 2023-02-15 NOTE — TELEPHONE ENCOUNTER
DR. HENLEY REFERRED PATIENT FOR NISSEN CONSULT. I COULDN'T REACH PATIENT BY PHONE BUT LEFT A VOICE MESSAGE TO RETURN MY CALL @ [phone] CONCERNING AN APPT.

## 2023-03-01 ENCOUNTER — OFFICE VISIT (OUTPATIENT)
Dept: SURGERY | Facility: CLINIC | Age: 58
End: 2023-03-01
Payer: MEDICARE

## 2023-03-01 ENCOUNTER — TELEPHONE (OUTPATIENT)
Dept: SURGERY | Facility: CLINIC | Age: 58
End: 2023-03-01
Payer: MEDICARE

## 2023-03-01 VITALS
BODY MASS INDEX: 33.02 KG/M2 | WEIGHT: 198.2 LBS | HEART RATE: 78 BPM | SYSTOLIC BLOOD PRESSURE: 116 MMHG | DIASTOLIC BLOOD PRESSURE: 64 MMHG | HEIGHT: 65 IN

## 2023-03-01 DIAGNOSIS — K21.9 GASTROESOPHAGEAL REFLUX DISEASE, UNSPECIFIED WHETHER ESOPHAGITIS PRESENT: ICD-10-CM

## 2023-03-01 DIAGNOSIS — K21.9 GASTROESOPHAGEAL REFLUX DISEASE WITHOUT ESOPHAGITIS: Primary | ICD-10-CM

## 2023-03-01 DIAGNOSIS — R13.10 DYSPHAGIA, UNSPECIFIED TYPE: Primary | ICD-10-CM

## 2023-03-01 PROCEDURE — 99204 OFFICE O/P NEW MOD 45 MIN: CPT | Performed by: SURGERY

## 2023-03-01 RX ORDER — SODIUM CHLORIDE 0.9 % (FLUSH) 0.9 %
10 SYRINGE (ML) INJECTION AS NEEDED
Status: CANCELLED | OUTPATIENT
Start: 2023-03-10

## 2023-03-01 RX ORDER — TRIAMCINOLONE ACETONIDE 1 MG/G
CREAM TOPICAL
COMMUNITY

## 2023-03-01 RX ORDER — SOLIFENACIN SUCCINATE 10 MG/1
10 TABLET, FILM COATED ORAL DAILY
COMMUNITY

## 2023-03-01 RX ORDER — AZELASTINE 1 MG/ML
SPRAY, METERED NASAL
COMMUNITY
Start: 2023-02-09

## 2023-03-01 RX ORDER — ASCORBIC ACID 500 MG
1 TABLET ORAL DAILY
COMMUNITY
Start: 2023-02-16

## 2023-03-01 RX ORDER — BIOTIN 10000 MCG
CAPSULE ORAL
COMMUNITY

## 2023-03-01 RX ORDER — SODIUM CHLORIDE 9 MG/ML
40 INJECTION, SOLUTION INTRAVENOUS AS NEEDED
Status: CANCELLED | OUTPATIENT
Start: 2023-03-10

## 2023-03-01 RX ORDER — FLUTICASONE PROPIONATE 50 MCG
2 SPRAY, SUSPENSION (ML) NASAL DAILY
COMMUNITY
Start: 2023-02-09

## 2023-03-01 RX ORDER — LANOLIN ALCOHOL/MO/W.PET/CERES
1000 CREAM (GRAM) TOPICAL DAILY
COMMUNITY

## 2023-03-01 RX ORDER — SODIUM CHLORIDE 0.9 % (FLUSH) 0.9 %
10 SYRINGE (ML) INJECTION EVERY 12 HOURS SCHEDULED
Status: CANCELLED | OUTPATIENT
Start: 2023-03-10

## 2023-03-01 RX ORDER — FERROUS SULFATE 325(65) MG
1 TABLET ORAL DAILY
COMMUNITY
Start: 2023-02-16

## 2023-03-01 NOTE — PROGRESS NOTES
Date of Service: 3/1/2023    Subjective   Mayuri Watts is a 57 y.o. female is being seen for consultation for reflux today at the request of Jimmie Gonzalez    Chief complaint: Reflux    Mayuri Watts is a 57 y.o. female nicotine user, with prior history of gastric sleeve by Dr. Palencai in 2016, presents my clinic with roughly 1 year complaints of worsening acid reflux, hoarseness, dysphagia to pills and solid foods, occasional dry cough.  She has been on pantoprazole 40 mg daily since her gastric sleeve surgery.  She reports occasional nausea and vomiting since her sleeve surgery with over eating.  She feels her symptoms are somewhat better when she sleeps inclined.  She had been taking diclofenac for her arthritis but was then advised to stop due to her prior history of gastric sleeve.  She reports that she is taking iron supplements.  She is on Humira for psoriasis.    With regards to her gastric sleeve surgery she started at a weight of approximate 340 pounds is down to 198 pounds.    Past Medical History:   Diagnosis Date   • Abnormal PFTs    • Anxiety    • Asthma    • Cervical disc disease     PT STATES BULGING DISKS IN NECK, HAS BEEN DOING PHYSICAL THERAPY AND STEROID SHOTS (PAST) FOR THIS   • Chronic back pain     HX OF BACK SURGERY   • Chronic narcotic use    • Depression    • Dysphagia     rare cervical dysphagia for steak   • Elevated LFTs    • Fibromyalgia    • Foot pain     LEFT FOOT PAIN, DUE TO TORN TENDON   • GERD (gastroesophageal reflux disease)    • History of Papanicolaou smear of cervix 2015    NORMAL    • HTN (hypertension)    • Hypercholesteremia    • Hypothyroidism    • Joint pain    • Mixed stress and urge urinary incontinence    • Morbid obesity (HCC)    •  (normal spontaneous vaginal delivery)     x1 w/o complic   • Osteoarthritis    • Osteoporosis    • Pelvic prolapse    • Polycythemia    • Psoriasis    • Psoriatic arthritis (HCC)    • Sleep apnea     CPAP compliant - SET  AT 16, RESPIRATORY NOTIFIED.   • Stress incontinence    • Urinary incontinence    • Urinary tract infection    • Vitamin D deficiency    • Wears glasses        Past Surgical History:   Procedure Laterality Date   • CARDIAC CATHETERIZATION      NO INTERVENTION   • COLONOSCOPY      PT STATES POLYPS REMOVED   • DILATION AND CURETTAGE, DIAGNOSTIC / THERAPEUTIC     • GASTRIC SLEEVE LAPAROSCOPIC N/A 2016    Procedure: GASTRIC SLEEVE LAPAROSCOPIC;  Surgeon: Garrett Palencia MD;  Location: [facility];  Service:    • KNEE ARTHROSCOPY Left 2008   • LAPAROSCOPIC CHOLECYSTECTOMY      no stones. [facility].   • LAPAROSCOPIC TUBAL LIGATION     • LUMBAR FUSION      L4-5 disc and hardware [facility]   • WISDOM TOOTH EXTRACTION           Family History   Problem Relation Age of Onset   • Breast cancer Mother    • Hypertension Mother    • Heart attack Sister    • Hypertension Sister    • Sleep apnea Sister    • Obesity Sister    • Diabetes Sister          Social History     Socioeconomic History   • Marital status:    Tobacco Use   • Smoking status: Former     Packs/day: 1.50     Years: 32.00     Pack years: 48.00     Types: Cigarettes     Quit date: 2/15/2023     Years since quittin.0   • Smokeless tobacco: Never   • Tobacco comments:     PT STATES SHE USES THE E-CIGARETTE OCCASIONALLY   Vaping Use   • Vaping Use: Every day   • Substances: Nicotine, Flavoring   Substance and Sexual Activity   • Alcohol use: No   • Drug use: No   • Sexual activity: Yes     Partners: Male                Review of Systems        Constitutional: No fevers, chills or malaise. No unintentional weight loss   Eyes: Denies visual changes    Cardiovascular: Denies chest pain, palpitations   Respiratory: Denies cough or shortness of breath   Abdominal/Gastrointestinal: See HPI    Genitourinary: Denies dysuria or hematuria   Musculoskeletal: Denies any chronic joint aches, pains or deformities              Skin: No lesions  "or rashes   Psychiatric: No recent mood changes   Neurologic: No paresthesias or loss of function        Objective     Physical Exam:      03/01/23  0849   Weight: 89.9 kg (198 lb 3.2 oz)    Body mass index is 32.98 kg/m².  Constitution: /64   Pulse 78   Ht 165.1 cm (65\")   Wt 89.9 kg (198 lb 3.2 oz)   BMI 32.98 kg/m²  . No acute distress  Head: Normocephalic, atraumatic.   Eyes: Aligned without strabismus. Conjunctivae noninjected   Ears, Nose, Mouth: , no lesions appreciated   CV: Rhythm  and rate regular   Respiratory: Symmetric chest expansion. No respiratory distress.   Gastrointestinal:  Soft, NT, ND  Skin:  No cyanosis, clubbing or edema bilaterally    Lymphatics: No abnormal cervical or supraclavicular adenopathy  Neurologic: No gross deficits.  Alert and oriented x3  Psychiatric: Normal mood          Assessment     Mayuri Watts is a 57 y.o. class I obese female nicotine user, history of gastric sleeve, with possible medically refractory gastroesophageal reflux disease and dysphagia    We discussed the work-up for her aforementioned symptoms to include an upper GI study as well as EGD with Bravo pH probe.  Surgical interventions are limited due to her prior history of gastric sleeve.  She may have an underlying hiatal hernia that is contributing to this.  Her nicotine use may also be contributing to her acid reflux.               Sebastian Truong MD  [facility]  "

## 2023-03-01 NOTE — TELEPHONE ENCOUNTER
"SPOKE TO PATIENT WITH THE FOLLOWING SCHEDULE:    UGI:  TUES MARCH 7, 2023 ARRIVE AT 1:15 PM AT [facility] OUT PATIENT ENTRANCE \"C\".  NOTHING TO EAT OR DRINK 8 HOURS PRIOR TO UGI.      EGD/ BRAVO:  STOP PROTONIX ON FRI MARCH 3/2023  FOLLOW BRAVO INSTRUCTIONS FOR ALL OTHER HEARTBURN / STOMACH MEDICATIONS.  NOTHING TO EAT OR DRINK AFTER MIDNIGHT ON Thursday MARCH 9, 2023.  ARRIVE AT [facility] AT 9:30 AM ON FRI MARCH 10, 2023    PLEASE HAVE AN ADULT  WITH YOU TO DRIVE YOU HOME AFTER PROCEDURE.    PATIENT VERBALIZED UNDERSTANDING AND AGREEMENT WITH THE ABOVE SCHEDULES, DATES, TIMES, LOCATIONS, PREP, PROCESS.    IF YOU HAVE ANY QUESTIONS OR CONCERNS, PLEASE CALL OUR CLINIC -[phone].  "

## 2023-03-01 NOTE — H&P (VIEW-ONLY)
Date of Service: 3/1/2023    Subjective   Mayuri Watts is a 57 y.o. female is being seen for consultation for reflux today at the request of Jimmie Gonzalez    Chief complaint: Reflux    Mayuri Watts is a 57 y.o. female nicotine user, with prior history of gastric sleeve by Dr. Palencia in 2016, presents my clinic with roughly 1 year complaints of worsening acid reflux, hoarseness, dysphagia to pills and solid foods, occasional dry cough.  She has been on pantoprazole 40 mg daily since her gastric sleeve surgery.  She reports occasional nausea and vomiting since her sleeve surgery with over eating.  She feels her symptoms are somewhat better when she sleeps inclined.  She had been taking diclofenac for her arthritis but was then advised to stop due to her prior history of gastric sleeve.  She reports that she is taking iron supplements.  She is on Humira for psoriasis.    With regards to her gastric sleeve surgery she started at a weight of approximate 340 pounds is down to 198 pounds.    Past Medical History:   Diagnosis Date   • Abnormal PFTs    • Anxiety    • Asthma    • Cervical disc disease     PT STATES BULGING DISKS IN NECK, HAS BEEN DOING PHYSICAL THERAPY AND STEROID SHOTS (PAST) FOR THIS   • Chronic back pain     HX OF BACK SURGERY   • Chronic narcotic use    • Depression    • Dysphagia     rare cervical dysphagia for steak   • Elevated LFTs    • Fibromyalgia    • Foot pain     LEFT FOOT PAIN, DUE TO TORN TENDON   • GERD (gastroesophageal reflux disease)    • History of Papanicolaou smear of cervix 2015    NORMAL    • HTN (hypertension)    • Hypercholesteremia    • Hypothyroidism    • Joint pain    • Mixed stress and urge urinary incontinence    • Morbid obesity (HCC)    •  (normal spontaneous vaginal delivery)     x1 w/o complic   • Osteoarthritis    • Osteoporosis    • Pelvic prolapse    • Polycythemia    • Psoriasis    • Psoriatic arthritis (HCC)    • Sleep apnea     CPAP compliant - SET  AT 16, RESPIRATORY NOTIFIED.   • Stress incontinence    • Urinary incontinence    • Urinary tract infection    • Vitamin D deficiency    • Wears glasses        Past Surgical History:   Procedure Laterality Date   • CARDIAC CATHETERIZATION      NO INTERVENTION   • COLONOSCOPY      PT STATES POLYPS REMOVED   • DILATION AND CURETTAGE, DIAGNOSTIC / THERAPEUTIC     • GASTRIC SLEEVE LAPAROSCOPIC N/A 2016    Procedure: GASTRIC SLEEVE LAPAROSCOPIC;  Surgeon: Garrett Palencia MD;  Location: [facility];  Service:    • KNEE ARTHROSCOPY Left 2008   • LAPAROSCOPIC CHOLECYSTECTOMY      no stones. [facility].   • LAPAROSCOPIC TUBAL LIGATION     • LUMBAR FUSION      L4-5 disc and hardware [facility]   • WISDOM TOOTH EXTRACTION           Family History   Problem Relation Age of Onset   • Breast cancer Mother    • Hypertension Mother    • Heart attack Sister    • Hypertension Sister    • Sleep apnea Sister    • Obesity Sister    • Diabetes Sister          Social History     Socioeconomic History   • Marital status:    Tobacco Use   • Smoking status: Former     Packs/day: 1.50     Years: 32.00     Pack years: 48.00     Types: Cigarettes     Quit date: 2/15/2023     Years since quittin.0   • Smokeless tobacco: Never   • Tobacco comments:     PT STATES SHE USES THE E-CIGARETTE OCCASIONALLY   Vaping Use   • Vaping Use: Every day   • Substances: Nicotine, Flavoring   Substance and Sexual Activity   • Alcohol use: No   • Drug use: No   • Sexual activity: Yes     Partners: Male                Review of Systems        Constitutional: No fevers, chills or malaise. No unintentional weight loss   Eyes: Denies visual changes    Cardiovascular: Denies chest pain, palpitations   Respiratory: Denies cough or shortness of breath   Abdominal/Gastrointestinal: See HPI    Genitourinary: Denies dysuria or hematuria   Musculoskeletal: Denies any chronic joint aches, pains or deformities              Skin: No lesions  "or rashes   Psychiatric: No recent mood changes   Neurologic: No paresthesias or loss of function        Objective     Physical Exam:      03/01/23  0849   Weight: 89.9 kg (198 lb 3.2 oz)    Body mass index is 32.98 kg/m².  Constitution: /64   Pulse 78   Ht 165.1 cm (65\")   Wt 89.9 kg (198 lb 3.2 oz)   BMI 32.98 kg/m²  . No acute distress  Head: Normocephalic, atraumatic.   Eyes: Aligned without strabismus. Conjunctivae noninjected   Ears, Nose, Mouth: , no lesions appreciated   CV: Rhythm  and rate regular   Respiratory: Symmetric chest expansion. No respiratory distress.   Gastrointestinal:  Soft, NT, ND  Skin:  No cyanosis, clubbing or edema bilaterally    Lymphatics: No abnormal cervical or supraclavicular adenopathy  Neurologic: No gross deficits.  Alert and oriented x3  Psychiatric: Normal mood          Assessment     Mayuri Watts is a 57 y.o. class I obese female nicotine user, history of gastric sleeve, with possible medically refractory gastroesophageal reflux disease and dysphagia    We discussed the work-up for her aforementioned symptoms to include an upper GI study as well as EGD with Bravo pH probe.  Surgical interventions are limited due to her prior history of gastric sleeve.  She may have an underlying hiatal hernia that is contributing to this.  Her nicotine use may also be contributing to her acid reflux.               Sebastian Truong MD  [facility]  "

## 2023-03-02 ENCOUNTER — TELEPHONE (OUTPATIENT)
Dept: BARIATRICS/WEIGHT MGMT | Facility: CLINIC | Age: 58
End: 2023-03-02
Payer: MEDICARE

## 2023-03-02 NOTE — TELEPHONE ENCOUNTER
Patient w/ hx LSG 2016 w/ Dr. Palencia.  Recently evaluated by Dr. Truong for uncontrolled reflux - office notes routed to Dr. Palencia.  Anjelica (P).

## 2023-03-07 ENCOUNTER — HOSPITAL ENCOUNTER (OUTPATIENT)
Dept: GENERAL RADIOLOGY | Facility: HOSPITAL | Age: 58
Discharge: HOME | End: 2023-03-07
Admitting: SURGERY
Payer: MEDICARE

## 2023-03-07 DIAGNOSIS — K21.9 GASTROESOPHAGEAL REFLUX DISEASE, UNSPECIFIED WHETHER ESOPHAGITIS PRESENT: ICD-10-CM

## 2023-03-07 DIAGNOSIS — R13.10 DYSPHAGIA, UNSPECIFIED TYPE: ICD-10-CM

## 2023-03-07 PROCEDURE — 74246 X-RAY XM UPR GI TRC 2CNTRST: CPT

## 2023-03-10 ENCOUNTER — HOSPITAL ENCOUNTER (OUTPATIENT)
Facility: HOSPITAL | Age: 58
Setting detail: HOSPITAL OUTPATIENT SURGERY
Discharge: HOME | End: 2023-03-10
Attending: SURGERY | Admitting: SURGERY
Payer: MEDICARE

## 2023-03-10 ENCOUNTER — ANESTHESIA EVENT (OUTPATIENT)
Dept: PERIOP | Facility: HOSPITAL | Age: 58
End: 2023-03-10
Payer: MEDICARE

## 2023-03-10 ENCOUNTER — ANESTHESIA (OUTPATIENT)
Dept: PERIOP | Facility: HOSPITAL | Age: 58
End: 2023-03-10
Payer: MEDICARE

## 2023-03-10 VITALS
DIASTOLIC BLOOD PRESSURE: 87 MMHG | RESPIRATION RATE: 18 BRPM | BODY MASS INDEX: 32.65 KG/M2 | WEIGHT: 196 LBS | SYSTOLIC BLOOD PRESSURE: 127 MMHG | HEART RATE: 63 BPM | HEIGHT: 65 IN | TEMPERATURE: 97 F | OXYGEN SATURATION: 98 %

## 2023-03-10 DIAGNOSIS — K21.9 GASTROESOPHAGEAL REFLUX DISEASE WITHOUT ESOPHAGITIS: ICD-10-CM

## 2023-03-10 PROCEDURE — 25010000002 PROPOFOL 200 MG/20ML EMULSION: Performed by: NURSE ANESTHETIST, CERTIFIED REGISTERED

## 2023-03-10 PROCEDURE — 88305 TISSUE EXAM BY PATHOLOGIST: CPT

## 2023-03-10 RX ORDER — SODIUM CHLORIDE 0.9 % (FLUSH) 0.9 %
10 SYRINGE (ML) INJECTION AS NEEDED
Status: DISCONTINUED | OUTPATIENT
Start: 2023-03-10 | End: 2023-03-10 | Stop reason: HOSPADM

## 2023-03-10 RX ORDER — SODIUM CHLORIDE 0.9 % (FLUSH) 0.9 %
10 SYRINGE (ML) INJECTION EVERY 12 HOURS SCHEDULED
Status: DISCONTINUED | OUTPATIENT
Start: 2023-03-10 | End: 2023-03-10 | Stop reason: HOSPADM

## 2023-03-10 RX ORDER — PROPOFOL 10 MG/ML
INJECTION, EMULSION INTRAVENOUS CONTINUOUS PRN
Status: DISCONTINUED | OUTPATIENT
Start: 2023-03-10 | End: 2023-03-10 | Stop reason: SURG

## 2023-03-10 RX ORDER — LIDOCAINE HYDROCHLORIDE 20 MG/ML
INJECTION, SOLUTION EPIDURAL; INFILTRATION; INTRACAUDAL; PERINEURAL AS NEEDED
Status: DISCONTINUED | OUTPATIENT
Start: 2023-03-10 | End: 2023-03-10 | Stop reason: SURG

## 2023-03-10 RX ORDER — SODIUM CHLORIDE, SODIUM LACTATE, POTASSIUM CHLORIDE, CALCIUM CHLORIDE 600; 310; 30; 20 MG/100ML; MG/100ML; MG/100ML; MG/100ML
125 INJECTION, SOLUTION INTRAVENOUS CONTINUOUS
Status: DISCONTINUED | OUTPATIENT
Start: 2023-03-10 | End: 2023-03-10 | Stop reason: HOSPADM

## 2023-03-10 RX ORDER — SODIUM CHLORIDE 9 MG/ML
40 INJECTION, SOLUTION INTRAVENOUS AS NEEDED
Status: DISCONTINUED | OUTPATIENT
Start: 2023-03-10 | End: 2023-03-10 | Stop reason: HOSPADM

## 2023-03-10 RX ORDER — SODIUM CHLORIDE, SODIUM LACTATE, POTASSIUM CHLORIDE, CALCIUM CHLORIDE 600; 310; 30; 20 MG/100ML; MG/100ML; MG/100ML; MG/100ML
INJECTION, SOLUTION INTRAVENOUS CONTINUOUS PRN
Status: DISCONTINUED | OUTPATIENT
Start: 2023-03-10 | End: 2023-03-10 | Stop reason: SURG

## 2023-03-10 RX ORDER — MIDAZOLAM HYDROCHLORIDE 1 MG/ML
1 INJECTION INTRAMUSCULAR; INTRAVENOUS
Status: DISCONTINUED | OUTPATIENT
Start: 2023-03-10 | End: 2023-03-10 | Stop reason: HOSPADM

## 2023-03-10 RX ADMIN — SODIUM CHLORIDE, POTASSIUM CHLORIDE, SODIUM LACTATE AND CALCIUM CHLORIDE: 600; 310; 30; 20 INJECTION, SOLUTION INTRAVENOUS at 10:16

## 2023-03-10 RX ADMIN — PROPOFOL 150 MCG/KG/MIN: 10 INJECTION, EMULSION INTRAVENOUS at 10:16

## 2023-03-10 RX ADMIN — LIDOCAINE HYDROCHLORIDE 60 MG: 20 INJECTION, SOLUTION EPIDURAL; INFILTRATION; INTRACAUDAL; PERINEURAL at 10:16

## 2023-03-10 NOTE — ANESTHESIA POSTPROCEDURE EVALUATION
Patient: Mayuri Watts    Procedure Summary     Date: 03/10/23 Room / Location: [facility] OR  /  [facility] OR    Anesthesia Start: 1015 Anesthesia Stop: 1026    Procedure: ESOPHAGOGASTRODUODENOSCOPY WITH BRAVO (Esophagus) Diagnosis:       Gastroesophageal reflux disease without esophagitis      (Gastroesophageal reflux disease without esophagitis [K21.9])    Surgeons: Sebastian Truong MD Provider: Alfred Summers MD    Anesthesia Type: general ASA Status: 3          Anesthesia Type: general    Vitals  Vitals Value Taken Time   /87 03/10/23 1056   Temp 97 °F (36.1 °C) 03/10/23 1026   Pulse 63 03/10/23 1056   Resp 18 03/10/23 1056   SpO2 98 % 03/10/23 1056           Post Anesthesia Care and Evaluation    Patient location during evaluation: PACU  Patient participation: complete - patient participated  Level of consciousness: awake  Pain score: 0  Pain management: adequate    Airway patency: patent  Anesthetic complications: No anesthetic complications  PONV Status: none  Cardiovascular status: acceptable  Respiratory status: acceptable  Hydration status: acceptable

## 2023-03-10 NOTE — ANESTHESIA PREPROCEDURE EVALUATION
Anesthesia Evaluation     Patient summary reviewed and Nursing notes reviewed   no history of anesthetic complications:  NPO Solid Status: > 8 hours  NPO Liquid Status: > 8 hours           Airway   Mallampati: I  TM distance: >3 FB  Dental    (+) upper dentures and lower dentures    Pulmonary     breath sounds clear to auscultation  (+) asthma,sleep apnea on CPAP,   Cardiovascular   Exercise tolerance: good (4-7 METS)    Rhythm: regular  Rate: normal    (+) hypertension, hyperlipidemia,       Neuro/Psych  (+) psychiatric history Depression,    GI/Hepatic/Renal/Endo    (+) obesity,  GERD,  thyroid problem hypothyroidism    Musculoskeletal     Abdominal     Abdomen: soft.   Substance History - negative use     OB/GYN    (+) Pregnant,         Other   arthritis,                      Anesthesia Plan    ASA 3     general     intravenous induction     Anesthetic plan, risks, benefits, and alternatives have been provided, discussed and informed consent has been obtained with: patient.    Use of blood products discussed with consented to blood products.   Plan discussed with CRNA.        CODE STATUS:

## 2023-03-13 LAB — REF LAB TEST METHOD: NORMAL

## 2023-03-28 ENCOUNTER — OFFICE VISIT (OUTPATIENT)
Dept: PULMONOLOGY | Facility: CLINIC | Age: 58
End: 2023-03-28
Payer: MEDICARE

## 2023-03-28 ENCOUNTER — PATIENT ROUNDING (BHMG ONLY) (OUTPATIENT)
Dept: PULMONOLOGY | Facility: CLINIC | Age: 58
End: 2023-03-28
Payer: MEDICARE

## 2023-03-28 VITALS
BODY MASS INDEX: 32.82 KG/M2 | WEIGHT: 197 LBS | HEART RATE: 69 BPM | OXYGEN SATURATION: 95 % | HEIGHT: 65 IN | RESPIRATION RATE: 14 BRPM | DIASTOLIC BLOOD PRESSURE: 68 MMHG | SYSTOLIC BLOOD PRESSURE: 116 MMHG

## 2023-03-28 DIAGNOSIS — Z87.891 PERSONAL HISTORY OF NICOTINE DEPENDENCE: ICD-10-CM

## 2023-03-28 DIAGNOSIS — E66.9 OBESITY (BMI 30-39.9): ICD-10-CM

## 2023-03-28 DIAGNOSIS — R06.02 SHORTNESS OF BREATH: ICD-10-CM

## 2023-03-28 DIAGNOSIS — R91.1 LUNG NODULE, SOLITARY: ICD-10-CM

## 2023-03-28 DIAGNOSIS — J30.89 OTHER ALLERGIC RHINITIS: ICD-10-CM

## 2023-03-28 DIAGNOSIS — R06.83 SNORING: ICD-10-CM

## 2023-03-28 DIAGNOSIS — G47.19 EXCESSIVE DAYTIME SLEEPINESS: ICD-10-CM

## 2023-03-28 DIAGNOSIS — R06.02 SHORTNESS OF BREATH: Primary | ICD-10-CM

## 2023-03-28 DIAGNOSIS — J44.9 CHRONIC OBSTRUCTIVE PULMONARY DISEASE, UNSPECIFIED COPD TYPE: ICD-10-CM

## 2023-03-28 DIAGNOSIS — G47.33 OBSTRUCTIVE SLEEP APNEA: Primary | ICD-10-CM

## 2023-03-28 PROCEDURE — 3078F DIAST BP <80 MM HG: CPT | Performed by: INTERNAL MEDICINE

## 2023-03-28 PROCEDURE — 94729 DIFFUSING CAPACITY: CPT | Performed by: INTERNAL MEDICINE

## 2023-03-28 PROCEDURE — 94060 EVALUATION OF WHEEZING: CPT | Performed by: INTERNAL MEDICINE

## 2023-03-28 PROCEDURE — 99204 OFFICE O/P NEW MOD 45 MIN: CPT | Performed by: INTERNAL MEDICINE

## 2023-03-28 PROCEDURE — 3074F SYST BP LT 130 MM HG: CPT | Performed by: INTERNAL MEDICINE

## 2023-03-28 PROCEDURE — 94726 PLETHYSMOGRAPHY LUNG VOLUMES: CPT | Performed by: INTERNAL MEDICINE

## 2023-03-28 PROCEDURE — 95012 NITRIC OXIDE EXP GAS DETER: CPT | Performed by: INTERNAL MEDICINE

## 2023-03-28 RX ORDER — LEVOTHYROXINE SODIUM 0.1 MG/1
TABLET ORAL
COMMUNITY

## 2023-03-28 RX ORDER — IPRATROPIUM BROMIDE AND ALBUTEROL SULFATE 2.5; .5 MG/3ML; MG/3ML
SOLUTION RESPIRATORY (INHALATION)
COMMUNITY

## 2023-03-28 RX ORDER — TIOTROPIUM BROMIDE INHALATION SPRAY 3.12 UG/1
2 SPRAY, METERED RESPIRATORY (INHALATION) DAILY
Qty: 1 EACH | Refills: 5 | Status: SHIPPED | OUTPATIENT
Start: 2023-03-28

## 2023-03-28 RX ORDER — GABAPENTIN 600 MG/1
TABLET ORAL EVERY 8 HOURS SCHEDULED
COMMUNITY
End: 2023-03-28

## 2023-03-28 RX ORDER — KETOCONAZOLE 20 MG/G
CREAM TOPICAL
COMMUNITY

## 2023-03-28 RX ORDER — ALBUTEROL SULFATE 90 UG/1
2 AEROSOL, METERED RESPIRATORY (INHALATION) EVERY 4 HOURS PRN
Qty: 18 G | Refills: 5 | Status: SHIPPED | OUTPATIENT
Start: 2023-03-28

## 2023-03-28 RX ORDER — TIZANIDINE 2 MG/1
TABLET ORAL
COMMUNITY
Start: 2022-06-20

## 2023-03-28 RX ORDER — IPRATROPIUM BROMIDE 21 UG/1
SPRAY, METERED NASAL
COMMUNITY

## 2023-03-28 NOTE — PROGRESS NOTES
CONSULT NOTE    Requested by:   Ninoska Chris*   Ninoska Chris, KORIN      Chief Complaint   Patient presents with   • Breathing Problem   • Consult       Subjective:  Mayuri Watts is a 57 y.o. female.     History of Present Illness   Patient comes in today for consultation because of sleep apnea.  The patient says that  she was diagnosed with sleep apnea 20 years ago.  It appears that she has been on a PAP device since then.    Patient doesn't report any issues with the device. Patient says that the compliance with the use of the equipment is good.      Patient does report some initial improvement but now feels that she is once again feeling tired in the morning and occasionally has noticed excessive daytime sleepiness as well.      Patient is aware of snoring through the device.     Patient says that she is having issues with her current mask and it occasionally leaks bad enough for her not to be able to use the device for most of the night.    Patient's sleep schedule was reviewed.     Patient says that the compliance with the use of the equipment is good.     Upon questioning she is complaining of shortness of breath. Patient says that for the past few years, she has had shortness of breath. Patient has had decreased exercise capacity although it is likely due to her back pain etc.     Patient also says that she brings up phlegm in the morning along with cough.     The patient has a dog at home. There seems to be a seasonal variation to the symptoms.    The patient is currently not on oxygen.     Patient reports smoking 1 packs per day for the past 30 years.  she quit smoking 2 months ago.    Patient does have a family history of lung diseases, including COPD in her brother and 2 sisters with BHAVANA.    She is s/p Gastric Sleeve in 2015.     Patient underwent a CT due to history of smoking. she was told that the imaging study showed a 6 millimeter lung nodule  for which a pulmonology  consultation was requested    The patient describes no family members with a history of lung cancer.      The following portions of the patient's history were reviewed and updated as appropriate: allergies, current medications, past family history, past medical history, past social history and past surgical history.    Review of Systems   Constitutional: Negative for chills, fatigue and fever.   HENT: Negative for sinus pressure, sneezing and sore throat.    Respiratory: Negative for cough, chest tightness, shortness of breath and wheezing.    Cardiovascular: Negative for palpitations and leg swelling.   All other systems reviewed and are negative.      Past Medical History:   Diagnosis Date   • Abnormal PFTs    • Anxiety    • Asthma    • Cervical disc disease     PT STATES BULGING DISKS IN NECK, HAS BEEN DOING PHYSICAL THERAPY AND STEROID SHOTS (PAST) FOR THIS   • Chronic back pain     HX OF BACK SURGERY   • Chronic narcotic use    • Depression    • Dysphagia     rare cervical dysphagia for steak   • Elevated LFTs    • Fibromyalgia    • Foot pain     LEFT FOOT PAIN, DUE TO TORN TENDON   • GERD (gastroesophageal reflux disease)    • History of Papanicolaou smear of cervix 2015    NORMAL    • HTN (hypertension)    • Hypercholesteremia    • Hypothyroidism    • Joint pain    • Miscarriage    • Mixed stress and urge urinary incontinence    • Morbid obesity (HCC)    •  (normal spontaneous vaginal delivery)     x1 w/o complic   • Osteoarthritis    • Osteoporosis    • Pelvic prolapse    • Polycythemia    • Psoriasis    • Psoriatic arthritis (HCC)    • Sleep apnea     CPAP compliant - SET AT 16, RESPIRATORY NOTIFIED.   • Stress incontinence    • Urinary incontinence    • Urinary tract infection    • Vitamin D deficiency    • Wears glasses        Social History     Tobacco Use   • Smoking status: Former     Packs/day: 1.50     Years: 32.00     Pack years: 48.00     Types: Cigarettes     Quit date: 2/15/2023      "Years since quittin.1   • Smokeless tobacco: Never   • Tobacco comments:     PT STATES SHE USES THE E-CIGARETTE OCCASIONALLY   Substance Use Topics   • Alcohol use: No         Objective:  Visit Vitals  /68   Pulse 69   Resp 14   Ht 165.1 cm (65\") Comment: pt reported   Wt 89.4 kg (197 lb)   SpO2 95%   BMI 32.78 kg/m²       Physical Exam  Vitals reviewed.   Constitutional:       Appearance: She is well-developed.   HENT:      Head: Atraumatic.      Mouth/Throat:      Mouth: Mucous membranes are moist.      Comments: Oropharynx was crowded.  Dentures noted.   Eyes:      Pupils: Pupils are equal, round, and reactive to light.   Neck:      Thyroid: No thyromegaly.      Vascular: No JVD.      Trachea: No tracheal deviation.      Comments: Increased neck circumference noted.  Cardiovascular:      Rate and Rhythm: Normal rate and regular rhythm.   Pulmonary:      Effort: Pulmonary effort is normal. No respiratory distress.      Breath sounds: Normal breath sounds. No wheezing.      Comments: Somewhat hyperresonant to percussion.  Somewhat decreased air entry.  Mild scattered wheezing noted.   Musculoskeletal:      Right lower leg: No edema.      Left lower leg: No edema.      Comments: Gait was normal.   Skin:     General: Skin is warm and dry.   Neurological:      Mental Status: She is alert and oriented to person, place, and time.   Psychiatric:         Mood and Affect: Mood normal.         Behavior: Behavior normal.         Assessment/Plan:  Diagnoses and all orders for this visit:    1. Obstructive sleep apnea (Primary)  -     BIPAP / CPAP Adjustment  -     BIPAP / CPAP Adjustment    2. Snoring  -     BIPAP / CPAP Adjustment  -     BIPAP / CPAP Adjustment    3. Excessive daytime sleepiness    4. Obesity (BMI 30-39.9)  -     BIPAP / CPAP Adjustment  -     BIPAP / CPAP Adjustment    5. Shortness of breath  -     Pulmonary Function Test  -     Nitric Oxide    6. Chronic obstructive pulmonary disease, unspecified " COPD type (HCC)  -     Pulmonary Function Test  -     Nitric Oxide    7. Personal history of nicotine dependence  -     Cancel:  CT Chest Low Dose Cancer Screening WO; Future    8. Other allergic rhinitis    9. Lung nodule, solitary  -     CT Chest Without Contrast Diagnostic; Future    Other orders  -     tiotropium bromide monohydrate (Spiriva Respimat) 2.5 MCG/ACT aerosol solution inhaler; Inhale 2 puffs Daily.  Dispense: 1 each; Refill: 5  -     albuterol sulfate HFA (Ventolin HFA) 108 (90 Base) MCG/ACT inhaler; Inhale 2 puffs Every 4 (Four) Hours As Needed for Wheezing or Shortness of Air.  Dispense: 18 g; Refill: 5        Return in about 6 months (around 10/2/2023) for Recheck, Imaging, For Neelima Harry), ....Also 13 mths w/ Dr. Chávez.    DISCUSSION(if any):  Last chest x-ray was reviewed personally and the results were shared with the patient.  Images reviewed personally.   No acute pulmonary disease noted.          Last CT scan was reviewed in great detail with the patient. Images reviewed personally.   6 mm lung nodule noted.          I have also reviewed her primary care provider's last note that mentions lung nodule.     ===========================  ===========================    PFTs were reviewed.  Mild obstruction with air trapping noted    FeNO level was 16 today.    Laboratory workup also showed   Lab Results   Component Value Date    HGB 16.9 (H) 12/29/2017    HGB 15.9 06/30/2017    HGB 14.5 03/31/2017   ,   Lab Results   Component Value Date    HCT 50.4 (H) 12/29/2017    HCT 48.1 (H) 06/30/2017    HCT 44.0 03/31/2017       Lab Results   Component Value Date    EOSABS 0.2 12/29/2017    EOSABS 0.6 (H) 06/30/2017    EOSABS 0.2 03/31/2017    & Laboratory workup also showed   Lab Results   Component Value Date    CO2 26 12/29/2017   ,   Lab Results   Component Value Date    HGBA1C 6.20 (H) 12/18/2016     ===========================  ===========================    We will try to obtain her last sleep  study results from the performing facility, if not already scanned in our system.     I told the patient that her symptoms are consistent with possibly poorly controlled sleep apnea.    Patient was advised to continue using PAP for at least 4 hours every night.    I have decided to empirically change her to AutoPAP at a pressure of 14/20.    It appears that some of her issues are secondary to the mask.  The patient was provided with a prescription for new mask and supplies.    The patient appears to have issues with getting used to the mask at night.  I recommended that she uses the mask for 15-30 minutes every day, to get used to it.    Patient was advised to call this office with any issues.    Weight loss was also discussed and advised.    Orders as above.    Based on history and physical exam, it seems that her shortness of breath is most likely from obstructive lung disease.     Patient was educated on compliance with, and the correct method of using the pulmonary medicines.     Side effects, of prescribed medicines, discussed.    I have told her that we will made further recommendations, based on clinical response.     The patient will have a CT in August 2023, to follow up on the lung nodule.     Patient was given reading material, as appropriate.     I have encouraged the patient to call or schedule a visit earlier, if there are any concerns.      Dictated utilizing Dragon dictation.    This document was electronically signed by Dilcia Chávez MD on 03/28/23 at 11:26 EDT

## 2023-03-29 ENCOUNTER — OFFICE VISIT (OUTPATIENT)
Dept: SURGERY | Facility: CLINIC | Age: 58
End: 2023-03-29
Payer: MEDICARE

## 2023-03-29 VITALS
HEIGHT: 65 IN | WEIGHT: 194.6 LBS | BODY MASS INDEX: 32.42 KG/M2 | SYSTOLIC BLOOD PRESSURE: 120 MMHG | HEART RATE: 73 BPM | DIASTOLIC BLOOD PRESSURE: 70 MMHG

## 2023-03-29 DIAGNOSIS — K21.9 GASTROESOPHAGEAL REFLUX DISEASE WITHOUT ESOPHAGITIS: Primary | ICD-10-CM

## 2023-03-29 DIAGNOSIS — R13.10 DYSPHAGIA, UNSPECIFIED TYPE: ICD-10-CM

## 2023-03-29 PROCEDURE — 1160F RVW MEDS BY RX/DR IN RCRD: CPT | Performed by: SURGERY

## 2023-03-29 PROCEDURE — 3078F DIAST BP <80 MM HG: CPT | Performed by: SURGERY

## 2023-03-29 PROCEDURE — 1159F MED LIST DOCD IN RCRD: CPT | Performed by: SURGERY

## 2023-03-29 PROCEDURE — 3074F SYST BP LT 130 MM HG: CPT | Performed by: SURGERY

## 2023-03-29 PROCEDURE — 99212 OFFICE O/P EST SF 10 MIN: CPT | Performed by: SURGERY

## 2023-03-29 NOTE — PROGRESS NOTES
Patient Name:  Mayuri Watts  YOB: 1965  [id number]           General Surgery History and Physical    Date of Service: 03/29/23    Chief Complaint-reflux    Subjective     Chief complaint: Reflux     Mayuri Watts is a 57 y.o. female nicotine user, with prior history of gastric sleeve by Dr. Palencia in 2016, presents my clinic with roughly 1 year complaints of worsening acid reflux, hoarseness, dysphagia to pills and solid foods, occasional dry cough.  She has been on pantoprazole 40 mg daily since her gastric sleeve surgery.  She reports occasional nausea and vomiting since her sleeve surgery with over eating.  She feels her symptoms are somewhat better when she sleeps inclined.  She had been taking diclofenac for her arthritis but was then advised to stop due to her prior history of gastric sleeve.  She reports that she is taking iron supplements.  She is on Humira for psoriasis.     With regards to her gastric sleeve surgery she started at a weight of approximate 340 pounds is down to 198 pounds.    Interval history: At the time of EGD and Bravo pH study, the patient states her symptoms of reflux were much worse while off of her PPI.  Now that she is back on her medication, she has minimal breakthrough symptoms.  Her upper GI study did demonstrate a sliding hiatal hernia with GERD.  EGD also demonstrated this with the hiatus at 38 cm from the teeth and GE junction at 35 cm from the teeth.  Bravo pH study demonstrated DeMeester score of 23 consistently.  She does report occasional issues swallowing her pills.    Allergy:   Allergies   Allergen Reactions   • Wellbutrin [Bupropion] Rash       Medications:    No current facility-administered medications for this visit.    Current Outpatient Medications on File Prior to Visit   Medication Sig   • albuterol sulfate HFA (Ventolin HFA) 108 (90 Base) MCG/ACT inhaler Inhale 2 puffs Every 4 (Four) Hours As Needed for Wheezing or Shortness of Air.   •  azelastine (ASTELIN) 0.1 % nasal spray INSTILL 2 SPRAYS INTO EACH NOSTRIL TWICE DAILY   • Biotin 10 MG capsule Take  by mouth. OTC   • Cetirizine HCl 10 MG capsule Take  by mouth.   • conjugated estrogens (Premarin) 0.625 MG/GM vaginal cream Use 0.5 grams intravaginally twice weekly to control symptoms.   • desonide (DESOWEN) 0.05 % cream    • FeroSul 325 (65 Fe) MG tablet Take 1 tablet by mouth Daily.   • fluticasone (FLONASE) 50 MCG/ACT nasal spray 2 sprays by Each Nare route Daily.   • gabapentin (NEURONTIN) 600 MG tablet 1 tablet 4 (Four) Times a Day.   • HUMIRA PEN 40 MG/0.8ML Pen-injector Kit    • HYDROcodone-acetaminophen (NORCO) 5-325 MG per tablet Take 1 tablet by mouth Every 6 (Six) Hours As Needed for moderate pain (4-6).   • ipratropium (ATROVENT) 0.03 % nasal spray ipratropium bromide 21 mcg (0.03 %) nasal spray   USE 2 SPRAYS IN EACH NOSTRIL EVERY 8 HOURS AS NEEDED FOR CONGESTION   • ipratropium-albuterol (DUO-NEB) 0.5-2.5 mg/3 ml nebulizer ipratropium 0.5 mg-albuterol 3 mg (2.5 mg base)/3 mL nebulization soln   USE 1 VIAL VIA NEBULIZER EVERY 6 HOURS AS NEEDED FOR WHEEZING OR SHORTNESS OF BREATH   • ketoconazole (NIZORAL) 2 % cream    • levothyroxine (SYNTHROID, LEVOTHROID) 100 MCG tablet levothyroxine 100 mcg tablet   TAKE 1 TABLET BY MOUTH EVERY DAY IN THE MORNING ON AN EMPTY STOMACH   • lisinopril-hydrochlorothiazide (PRINZIDE,ZESTORETIC) 10-12.5 MG per tablet    • Multiple Vitamins-Minerals (MULTIVITAMIN ADULT PO) Take 1 tablet by mouth Daily.   • NON FORMULARY OTC amberian for menopause   • pantoprazole (PROTONIX) 40 MG EC tablet Take 1 tablet by mouth Daily.   • solifenacin (VESICARE) 10 MG tablet Take 1 tablet by mouth Daily.   • tiotropium bromide monohydrate (Spiriva Respimat) 2.5 MCG/ACT aerosol solution inhaler Inhale 2 puffs Daily.   • tiZANidine (ZANAFLEX) 1 MG half tablet    • triamcinolone (KENALOG) 0.1 % cream    • venlafaxine XR (EFFEXOR-XR) 150 MG 24 hr capsule Take 1 capsule by mouth  Daily.   • VENTOLIN  (90 Base) MCG/ACT inhaler inhale 2 puffs by mouth every 4 to 6 hours if needed   • vitamin B-12 (CYANOCOBALAMIN) 1000 MCG tablet Take 1 tablet by mouth Daily. OTC   • vitamin C (ASCORBIC ACID) 500 MG tablet Take 1 tablet by mouth Daily.     No current facility-administered medications on file prior to visit.       PMHx:   Past Medical History:   Diagnosis Date   • Abnormal PFTs    • Anxiety    • Asthma    • Cervical disc disease     PT STATES BULGING DISKS IN NECK, HAS BEEN DOING PHYSICAL THERAPY AND STEROID SHOTS (PAST) FOR THIS   • Chronic back pain     HX OF BACK SURGERY   • Chronic narcotic use    • Depression    • Dysphagia     rare cervical dysphagia for steak   • Elevated LFTs    • Fibromyalgia    • Foot pain     LEFT FOOT PAIN, DUE TO TORN TENDON   • GERD (gastroesophageal reflux disease)    • History of Papanicolaou smear of cervix 2015    NORMAL    • HTN (hypertension)    • Hypercholesteremia    • Hypothyroidism    • Joint pain    • Miscarriage    • Mixed stress and urge urinary incontinence    • Morbid obesity (HCC)    •  (normal spontaneous vaginal delivery)     x1 w/o complic   • Osteoarthritis    • Osteoporosis    • Pelvic prolapse    • Polycythemia    • Psoriasis    • Psoriatic arthritis (HCC)    • Sleep apnea     CPAP compliant - SET AT 16, RESPIRATORY NOTIFIED.   • Stress incontinence    • Urinary incontinence    • Urinary tract infection    • Vitamin D deficiency    • Wears glasses          Past Surgical History:  Past Surgical History:   Procedure Laterality Date   • CARDIAC CATHETERIZATION      NO INTERVENTION   • COLONOSCOPY      PT STATES POLYPS REMOVED   • DILATION AND CURETTAGE, DIAGNOSTIC / THERAPEUTIC     • ENDOSCOPY N/A 3/10/2023    Procedure: ESOPHAGOGASTRODUODENOSCOPY WITH SAUCEDA;  Surgeon: Sebastian Truong MD;  Location: [facility];  Service: Gastroenterology;  Laterality: N/A;   • GASTRIC SLEEVE LAPAROSCOPIC N/A 2016    Procedure:  "GASTRIC SLEEVE LAPAROSCOPIC;  Surgeon: Garrett Palencia MD;  Location: [facility];  Service:    • KNEE ARTHROSCOPY Left 2008   • LAPAROSCOPIC CHOLECYSTECTOMY  2006    no stones. [facility].   • LAPAROSCOPIC TUBAL LIGATION  1990   • LUMBAR FUSION  2012    L4-5 disc and hardware [facility]   • TUBAL ABDOMINAL LIGATION     • WISDOM TOOTH EXTRACTION         Family History:   Problem Relation Age of Onset   • Breast cancer Mother     • Hypertension Mother     • Heart attack Sister     • Hypertension Sister     • Sleep apnea Sister     • Obesity Sister     • Diabetes Sister          Social History: E-cigarette use     Review of Systems  14 point review of systems is negative except for what is noted in HPI          Objective     Physical Exam:      Vital Signs  /70   Pulse 73   Ht 165.1 cm (65\")   Wt 88.3 kg (194 lb 9.6 oz)   BMI 32.38 kg/m²     Body mass index is 32.38 kg/m².    Physical Exam:      Constitution: /70   Pulse 73   Ht 165.1 cm (65\")   Wt 88.3 kg (194 lb 9.6 oz)   BMI 32.38 kg/m²  . No acute distress.   Head: Normocephalic, atraumatic.   Eyes: Aligned without strabismus. Conjunctivae noninjected   Ears, Nose, Mouth: . No lesions appreciated   CV: Rhythm  and rate regular   Respiratory: Symmetric chest expansion. No respiratory distress.   Gastrointestinal:  soft, nontender, nondistended  Skin:  No cyanosis, clubbing or edema bilaterally   Lymphatics: No abnormal cervical or supraclavicular lymphadenopathy appreciated   Neurologic: No gross deficits. Alert and oriented x3   Psychiatric: normal mood       Results Review: I have personally reviewed all of the recent lab and imaging results available at this time.    Upper GI demonstrates a sliding hiatal hernia with GERD    I personally reviewed and interpreted the Bravo pH study.  The patient's DeMeester score of around 22 on both days consistent with gastroesophageal reflux disease, off medication       Assessment & Plan     Assessment " and Plan:  57 y.o. female, history of gastric sleeve for weight loss, now with sliding hiatal hernia and overall medically controlled gastroesophageal reflux disease    I discussed that the patient will need to cease e-cigarette use, as nicotine contributes to GERD  I discussed the importance of continuing PPI therapy.  We discussed potential options to include medical treatment alone versus referral to Dr. Palencia to discuss primary hiatal hernia repair, or conversion to gastric bypass and the most extreme case.  We discussed that fundoplication is not an option since she no longer has a fundus.  At this time, the patient feels that she is overall medically controlled.  She has rare breakthrough symptoms.  Upon our discussion of the potential treatment options, she would like to continue medical therapy until it seems her symptoms are no longer controlled.    Sebastian Truong MD  [facility] Surgery  03/29/23  10:27 EDT

## 2023-07-24 DIAGNOSIS — R91.1 LUNG NODULE, SOLITARY: ICD-10-CM

## 2023-09-25 RX ORDER — TIOTROPIUM BROMIDE INHALATION SPRAY 3.12 UG/1
SPRAY, METERED RESPIRATORY (INHALATION)
Qty: 12 G | Refills: 0 | Status: SHIPPED | OUTPATIENT
Start: 2023-09-25

## 2023-10-11 ENCOUNTER — TELEPHONE (OUTPATIENT)
Dept: SURGERY | Facility: CLINIC | Age: 58
End: 2023-10-11
Payer: MEDICARE

## 2023-10-11 ENCOUNTER — OFFICE VISIT (OUTPATIENT)
Dept: PULMONOLOGY | Facility: CLINIC | Age: 58
End: 2023-10-11
Payer: MEDICARE

## 2023-10-11 VITALS
BODY MASS INDEX: 32.3 KG/M2 | WEIGHT: 201 LBS | DIASTOLIC BLOOD PRESSURE: 74 MMHG | RESPIRATION RATE: 14 BRPM | HEART RATE: 78 BPM | HEIGHT: 66 IN | SYSTOLIC BLOOD PRESSURE: 118 MMHG | OXYGEN SATURATION: 98 %

## 2023-10-11 DIAGNOSIS — J44.9 CHRONIC OBSTRUCTIVE PULMONARY DISEASE, UNSPECIFIED COPD TYPE: ICD-10-CM

## 2023-10-11 DIAGNOSIS — J30.89 OTHER ALLERGIC RHINITIS: ICD-10-CM

## 2023-10-11 DIAGNOSIS — G47.33 OBSTRUCTIVE SLEEP APNEA: Primary | ICD-10-CM

## 2023-10-11 DIAGNOSIS — E66.9 OBESITY (BMI 30-39.9): ICD-10-CM

## 2023-10-11 DIAGNOSIS — Z87.891 PERSONAL HISTORY OF NICOTINE DEPENDENCE: ICD-10-CM

## 2023-10-11 PROCEDURE — 99214 OFFICE O/P EST MOD 30 MIN: CPT | Performed by: NURSE PRACTITIONER

## 2023-10-11 PROCEDURE — 3078F DIAST BP <80 MM HG: CPT | Performed by: NURSE PRACTITIONER

## 2023-10-11 PROCEDURE — 3074F SYST BP LT 130 MM HG: CPT | Performed by: NURSE PRACTITIONER

## 2023-10-11 RX ORDER — FLUTICASONE PROPIONATE 50 MCG
SPRAY, SUSPENSION (ML) NASAL EVERY 24 HOURS
COMMUNITY
Start: 2023-04-21 | End: 2023-10-11

## 2023-10-11 RX ORDER — TIOTROPIUM BROMIDE INHALATION SPRAY 3.12 UG/1
2 SPRAY, METERED RESPIRATORY (INHALATION) DAILY
Qty: 12 G | Refills: 2 | Status: SHIPPED | OUTPATIENT
Start: 2023-10-11

## 2023-10-11 RX ORDER — MOMETASONE FUROATE 50 UG/1
2 SPRAY, METERED NASAL DAILY
Qty: 17 G | Refills: 5 | Status: SHIPPED | OUTPATIENT
Start: 2023-10-11

## 2023-10-11 RX ORDER — RISANKIZUMAB-RZAA 150 MG/ML
INJECTION SUBCUTANEOUS
COMMUNITY
Start: 2023-08-18

## 2023-10-11 RX ORDER — FEXOFENADINE HCL 180 MG/1
180 TABLET ORAL DAILY
Qty: 30 TABLET | Refills: 1 | Status: SHIPPED | OUTPATIENT
Start: 2023-10-11

## 2023-10-11 NOTE — PROGRESS NOTES
"Chief Complaint   Patient presents with    Breathing Problem    Follow-up         Subjective   Mayuri Watts is a 58 y.o. female.   Patient comes today for follow up of shortness of breath and COPD and BHAVANA.    Symptoms have been stable since the last clinic visit. Patient reports no recent exacerbations.     She has some head pressure and ear discomfort.     Patient is using medications, as prescribed. She uses Spiriva daily. She has not needed WENDIE in over 1 months.    Exercise tolerance has also remained stable.     Quit smoking 8 months ago. She uses e-cig occasionally but is trying to quit completely.      She uses CPAP some nights, but has not been using regular. She has a new puppy. She feels more rested when she uses the machine.       The following portions of the patient's history were reviewed and updated as appropriate: allergies, current medications, past family history, past medical history, past social history, and past surgical history.      Review of Systems   HENT:  Positive for ear pain and sinus pressure. Negative for sneezing and sore throat.    Respiratory:  Negative for cough, chest tightness, shortness of breath and wheezing.        Objective   Visit Vitals  /74   Pulse 78   Resp 14   Ht 166.4 cm (65.5\") Comment: pt reported   Wt 91.2 kg (201 lb)   SpO2 98%   BMI 32.94 kg/mý       Physical Exam  Vitals reviewed.   HENT:      Head: Atraumatic.      Ears:      Comments: Fluid behind TM bilaterally.     Mouth/Throat:      Mouth: Mucous membranes are moist.      Comments: Crowded oropharynx.  Eyes:      Extraocular Movements: Extraocular movements intact.   Cardiovascular:      Rate and Rhythm: Normal rate and regular rhythm.   Pulmonary:      Effort: Pulmonary effort is normal. No respiratory distress.      Breath sounds: Normal breath sounds. No wheezing.   Abdominal:      Comments: Obese abdomen.    Musculoskeletal:      Comments: Gait normal.    Skin:     General: Skin is warm. "   Neurological:      Mental Status: She is alert and oriented to person, place, and time.           Assessment & Plan   Diagnoses and all orders for this visit:    1. Obstructive sleep apnea (Primary)    2. Personal history of nicotine dependence    3. Obesity (BMI 30-39.9)    4. Chronic obstructive pulmonary disease, unspecified COPD type    5. Other allergic rhinitis    Other orders  -     fexofenadine (Allegra Allergy) 180 MG tablet; Take 1 tablet by mouth Daily.  Dispense: 30 tablet; Refill: 1  -     tiotropium bromide monohydrate (Spiriva Respimat) 2.5 MCG/ACT aerosol solution inhaler; Inhale 2 puffs Daily.  Dispense: 12 g; Refill: 2  -     mometasone (NASONEX) 50 MCG/ACT nasal spray; 2 sprays into the nostril(s) as directed by provider Daily.  Dispense: 17 g; Refill: 5           Return for keep appt in April.    DISCUSSION (if any):  PFTs were reviewed.  Mild obstruction with air trapping noted.    No change to the current medications has been made. COPD stable with current medications.  Continue Stiolto and rescue medication as directed.    I have ordered Allegra and Nasonex for uncontrolled allergic rhinitis symptoms.    Compliance with medications stressed.     Side effects of prescribed medications discussed with the patient    The patient belongs to the risk group for which lung cancer screening has been recommended. She will be due annual LDCT in July 2024.     I reviewed home sleep study from 1/2022 which was consistent with mild BHAVANA with AHI of 7.2 per hour.     Continue treatment with AutoPAP at a pressure of 14/20.    Patient's compliance data was reviewed and she is not compliant at this time. She will try to use machine more regularly. She definitely feels more rested when she uses CPAP machine.    Humidification setup, hose and mask care discussed.    Weight loss advised.    Use every night for at least 4 hours stressed.     Dictated utilizing Dragon dictation.    This document was electronically  signed by KORIN Vaughan October 11, 2023  12:01 EDT

## 2023-10-12 ENCOUNTER — PREP FOR SURGERY (OUTPATIENT)
Dept: OTHER | Facility: HOSPITAL | Age: 58
End: 2023-10-12
Payer: MEDICARE

## 2023-10-12 DIAGNOSIS — Z80.0 FAMILY HISTORY OF COLON CANCER REQUIRING SCREENING COLONOSCOPY: Primary | ICD-10-CM

## 2023-10-17 NOTE — PROGRESS NOTES
[facility] BARIATRIC SURGERY  [address]  [facility] [address]  [phone]    Mayuri Watts.  1965    Date of visit: 1/25/2017    REASON FOR VISIT: 1 month postop    HPI:  aMyuri Watts is a 51 y.o. female 1 month s/p LSG by Dr. Palencia performed on 12/21/16. Had trouble w/nausea and vomiting when she overeats, now that she knows how much she can eat, that is improving. She's not had issue for a week. Also reports diarrhea every time she drinks protein shots. Has improved since she quit drinking them. Now progressing through diet w/out issue- on stage 5.  Mayuri states that  she is eating 70 grams of protein per day. She reports eating 3 meals per day,2 snacks per day, drinking 4 or more 8-oz. glasses of water per day, no carbonated beverage consumption and is starting to do more exercises  regularly. Mayuri is taking Omeprazole .   Still avoiding NSAIDs  and Humira.  Taking  MVI, B12, B1, Vit D and iron. Pre-surgery weight: 345 lbs. Today's weight is (!) 306 lb (139 kg) pounds, today's BMI is Body mass index is 50.15 kg/(m^2)., she has a  loss of 19 pounds since the last visit and her weight loss since surgery is 39 pounds.     Past Medical History   Diagnosis Date   • Abnormal PFTs    • Arthritis    • Cervical disc disease      PT STATES BULGING DISKS IN NECK, HAS BEEN DOING PHYSICAL THERAPY AND STEROID SHOTS (PAST) FOR THIS   • Chronic back pain      HX OF BACK SURGERY   • Depression    • Elevated LFTs    • Fibromyalgia    • Foot pain      LEFT FOOT PAIN, DUE TO TORN TENDON   • HTN (hypertension)    • Hypercholesteremia    • Hypertension      PT STATES WELL CONTROLLED W DAILY MED   • Hypothyroidism    • Morbid obesity    • BHAVANA on CPAP    • Psoriasis    • Psoriatic arthritis    • Sleep apnea      CPAP compliant - SET AT 16, RESPIRATORY NOTIFIED.   • Stress incontinence    • Vitamin D deficiency    • Wears glasses      Past Surgical History   Procedure Laterality  Date   • Dilation and curettage, diagnostic / therapeutic     • Laparoscopic tubal ligation  1990   • Knee arthroscopy Left 2008   • Lumbar fusion  2012     L4-5   • Cardiac catheterization  2012     NO INTERVENTION   • Colonoscopy  2009     PT STATES POLYPS REMOVED   • Gastric sleeve laparoscopic N/A 12/21/2016     Procedure: GASTRIC SLEEVE LAPAROSCOPIC;  Surgeon: Garrett Palencia MD;  Location: [facility];  Service:    • Laparoscopic cholecystectomy  2006     no stones       Current Outpatient Prescriptions:   •  Calcium Citrate-Vitamin D (CALCIUM + D PO), Take 1 tablet by mouth Daily., Disp: , Rfl:   •  Cholecalciferol (RA VITAMIN D-3 PO), Take 1,000 Units by mouth Daily., Disp: , Rfl:   •  gabapentin (NEURONTIN) 300 MG capsule, Take 300 mg by mouth 3 (Three) Times a Day. PT STATES SHE NORMALLY ONLY TAKES IT ONCE A DAY, Disp: , Rfl:   •  HYDROcodone-acetaminophen (NORCO) 5-325 MG per tablet, Take 1 tablet by mouth Every 6 (Six) Hours As Needed for moderate pain (4-6)., Disp: 30 tablet, Rfl: 0  •  levothyroxine (SYNTHROID, LEVOTHROID) 175 MCG tablet, Take 175 mcg by mouth Daily., Disp: , Rfl:   •  lisinopril (PRINIVIL,ZESTRIL) 10 MG tablet, Take 1 tablet by mouth Daily., Disp: 30 tablet, Rfl: 0  •  methocarbamol (ROBAXIN) 750 MG tablet, Take 750 mg by mouth Daily As Needed for muscle spasms., Disp: , Rfl:   •  Multiple Vitamins-Minerals (MULTIVITAMIN ADULT PO), Take 1 tablet by mouth Daily., Disp: , Rfl:   •  omeprazole (PRILOSEC) 40 MG capsule, Take 1 capsule by mouth Daily for 60 days., Disp: 60 capsule, Rfl: 1  •  RA VITAMIN D-3 1000 UNITS tablet, , Disp: , Rfl: 0  •  terbinafine (lamiSIL) 250 MG tablet, , Disp: , Rfl: 0  •  venlafaxine XR (EFFEXOR-XR) 150 MG 24 hr capsule, Take 150 mg by mouth Daily., Disp: , Rfl:   Allergies   Allergen Reactions   • Wellbutrin [Bupropion] Rash     Social History     Social History   • Marital status:      Spouse name: N/A   • Number of children: N/A   • Years of  "education: N/A     Occupational History   • Not on file.     Social History Main Topics   • Smoking status: Former Smoker     Packs/day: 1.50     Years: 32.00     Types: Cigarettes     Quit date: 2012   • Smokeless tobacco: Never Used      Comment: PT STATES SHE USES THE E-CIGARETTE OCCASIONALLY   • Alcohol use No   • Drug use: No   • Sexual activity: Defer     Other Topics Concern   • Not on file     Social History Narrative    Lives in [facility].     Review of Systems   General ROS: Negative for - fatigue  Ophthalmic ROS: Negative for - vision changes  ENT ROS: Negative for - swallowing difficulty  Gastrointestinal ROS: Negative for - abdominal pain, constipation, heartburn/reflux  Neurological ROS: Negative for - dizziness, numbness/tingling of extremities, memory loss  Dermatological ROS: Negative for - Hair loss     Physical Exam:  Visit Vitals   • /78 (BP Location: Left arm, Patient Position: Sitting, Cuff Size: Large Adult)   • Pulse 66   • Temp 97.8 °F (36.6 °C) (Temporal Artery )   • Resp 18   • Ht 65.5\" (166.4 cm)   • Wt (!) 306 lb (139 kg)   • SpO2 99%   • BMI 50.15 kg/m2     General Appearance:  Well nourished.  In no acute distress.  Patient was observed to be obese.  Oral Cavity:   Buccal Mucosa: The buccal mucosa was moist.  Lungs:  Normal breath sounds/voice sounds.  Cardiovascular:   Heart Rate And Rhythm: Heart rate and rhythm normal.   Edema: No calf tenderness.  Abdomen:  Abdomen:incisions healing well.   Auscultation: The bowel sounds were normal.   Palpation: No mass was palpated in the abdomen, Soft, nontender, and nondistended.   Hernia: No hernia was discovered.  Musculoskeletal System:   General/bilateral: No edema present in extremities.  Normal movement of all extremities.  Neurological:  Was alert and oriented.   Gait And Stance: Gait and stance were normal.  Psychiatric:   Attitude: The attitude was cooperative.   Mood: Mood pleasant.  Skin:  The complexion was normal.  The skin " moisture was normal and the skin temperature was normal.    Assessment:   1 month s/p LSG; the patient is doing well.     ICD-10-CM ICD-9-CM   1. Status post bariatric surgery Z98.84 V45.86   2. Morbid obesity due to excess calories E66.01 278.01   3. B12 deficiency E53.8 266.2   4. Iron deficiency anemia, unspecified iron deficiency anemia type D50.9 280.9   5. Vitamin D deficiency E55.9 268.9   6. Other specified hypothyroidism E03.8 244.8   7. Essential hypertension I10 401.9         Plan:   Orders Placed This Encounter   Procedures   • CBC (No Diff)   • Comprehensive Metabolic Panel   • Ferritin   • Folate   • Iron   • Magnesium   • Methylmalonic Acid, Serum   • Phosphorus   • Prealbumin   • PTH, Intact   • Vitamin A   • Vitamin B1, Whole Blood   • Vitamin D 25 Hydroxy   • Zinc   • Vitamin E      · Advised to avoid drinking protein shots  • Advance diet per the manual. No ASA, NSAIDs, or steroids/Humira for 6 weeks.    • Diet history reviewed and discussed with the patient. Weight loss to date discussed with the patient.   • Recommended patient be sure to get at least 70 grams of protein per day by eating small, frequent meals all with high lean protein choices. Be sure to limit/cut back on daily carbohydrate intake. Discussed with the patient the recommended amount of water per day to intake- half of body weight in ounces. Reviewed vitamin requirements. Be sure to do routine exercise, 150 minutes per week minimum, including both cardio and strength training. Behavior modifications recommended and instructed to call the office for concerns, questions, or problems.   • The patient was instructed to follow up in 2 months  note: approx 15 of the 25 minute visit was spent counseling on dietary/lifestyle modifications    Becky Lee, PAC  1/25/2017   Depth Of Tumor Invasion (For Histology): epidermis

## 2024-04-29 ENCOUNTER — OFFICE VISIT (OUTPATIENT)
Dept: PULMONOLOGY | Facility: CLINIC | Age: 59
End: 2024-04-29
Payer: MEDICARE

## 2024-04-29 VITALS
HEIGHT: 66 IN | SYSTOLIC BLOOD PRESSURE: 128 MMHG | OXYGEN SATURATION: 93 % | DIASTOLIC BLOOD PRESSURE: 78 MMHG | WEIGHT: 202 LBS | BODY MASS INDEX: 32.47 KG/M2 | HEART RATE: 75 BPM | RESPIRATION RATE: 14 BRPM

## 2024-04-29 DIAGNOSIS — G47.33 OBSTRUCTIVE SLEEP APNEA: ICD-10-CM

## 2024-04-29 DIAGNOSIS — Z87.891 PERSONAL HISTORY OF NICOTINE DEPENDENCE: ICD-10-CM

## 2024-04-29 DIAGNOSIS — J44.9 CHRONIC OBSTRUCTIVE PULMONARY DISEASE, UNSPECIFIED COPD TYPE: Primary | ICD-10-CM

## 2024-04-29 PROCEDURE — 99214 OFFICE O/P EST MOD 30 MIN: CPT | Performed by: INTERNAL MEDICINE

## 2024-04-29 PROCEDURE — 3074F SYST BP LT 130 MM HG: CPT | Performed by: INTERNAL MEDICINE

## 2024-04-29 PROCEDURE — 3078F DIAST BP <80 MM HG: CPT | Performed by: INTERNAL MEDICINE

## 2024-04-29 RX ORDER — TIOTROPIUM BROMIDE INHALATION SPRAY 3.12 UG/1
2 SPRAY, METERED RESPIRATORY (INHALATION) DAILY
Qty: 12 G | Refills: 2 | Status: SHIPPED | OUTPATIENT
Start: 2024-04-29

## 2024-04-29 RX ORDER — ALBUTEROL SULFATE 90 UG/1
2 AEROSOL, METERED RESPIRATORY (INHALATION) EVERY 4 HOURS PRN
Qty: 18 G | Refills: 5 | Status: SHIPPED | OUTPATIENT
Start: 2024-04-29

## 2024-04-29 NOTE — PROGRESS NOTES
"  Chief Complaint   Patient presents with    Sleeping Problem    Follow-up         Subjective   Mayuri Watts is a 58 y.o. female.   Patient was evaluated today for follow up of shortness of breath, BHAVANA and COPD.     Patient says that her symptoms have been stable since the last clinic visit. she reports no recent exacerbations.     Patient is using Spiriva, as prescribed. Exercise tolerance has also remained stable.     Continues to vape once per week. Used to smoke 1 PPD or so, for about 45 years, but quit 1 year ago.    Patient doesn't report any issues with the PAP device. The patient describes no significant issues with her mask either.     Patient says that the compliance with the use of the equipment is good, although she does forget to use it occasionally.    Patient says that her symptoms of fatigue & daytime sleepiness have been helped greatly with the use of PAP, as prescribed.     she had a recalled device and was given a replacement device instead of the recalled device in April 2022.       The following portions of the patient's history were reviewed and updated as appropriate: allergies, current medications, past family history, past medical history, past social history, and past surgical history.    Review of Systems   HENT:  Positive for sinus pressure. Negative for sneezing and sore throat.    Respiratory:  Negative for cough, chest tightness, shortness of breath and wheezing.        Objective   Visit Vitals  /78   Pulse 75   Resp 14   Ht 166.4 cm (65.5\") Comment: pt reported   Wt 91.6 kg (202 lb)   SpO2 93%   BMI 33.10 kg/m²         BMI Readings from Last 3 Encounters:   04/29/24 33.10 kg/m²   10/11/23 32.94 kg/m²   03/29/23 32.38 kg/m²       Physical Exam  Vitals reviewed.   Constitutional:       Appearance: She is well-developed.   HENT:      Head: Atraumatic.      Mouth/Throat:      Comments: Oropharynx was crowded.  Dentures noted.   Cardiovascular:      Rate and Rhythm: Normal rate " and regular rhythm.      Heart sounds: Normal heart sounds. No murmur heard.  Pulmonary:      Effort: Pulmonary effort is normal. No respiratory distress.      Breath sounds: Normal breath sounds. No wheezing or rales.   Neurological:      Mental Status: She is alert and oriented to person, place, and time.           Assessment & Plan   Diagnoses and all orders for this visit:    1. Chronic obstructive pulmonary disease, unspecified COPD type (Primary)    2. Obstructive sleep apnea    3. Personal history of nicotine dependence  -      CT Chest Low Dose Cancer Screening WO; Future    Other orders  -     tiotropium bromide monohydrate (Spiriva Respimat) 2.5 MCG/ACT aerosol solution inhaler; Inhale 2 puffs Daily.  Dispense: 12 g; Refill: 2  -     albuterol sulfate HFA (Ventolin HFA) 108 (90 Base) MCG/ACT inhaler; Inhale 2 puffs Every 4 (Four) Hours As Needed for Wheezing or Shortness of Air.  Dispense: 18 g; Refill: 5           Return in about 9 months (around 2/6/2025) for Recheck, Imaging, For [place] ([place]).    DISCUSSION (if any):  Last CT scan results was reviewed in great detail with the patient.  Results for orders placed in visit on 07/24/23    CT Chest Without Contrast Diagnostic  No acute abnormality noted.     Latest available PFTs were reviewed.  Consistent with mild COPD    We have reviewed her pulmonary medications in great detail.    Any needed adjustments to her pulmonary medications, either for clinical or insurance coverage reasons, have been made and are reflected in the orders.    Compliance with medications stressed.     Side effects of prescribed medications discussed with the patient    Patient was strongly encouraged to quit vaping if possible.    The patient belongs to the risk group for which lung cancer screening has been recommended. We will try to make arrangements for the same and this will be indicated in July. This has been ordered     Sleep study performed in Jan 2022  AHI was 7.2 /  hour.     Latest PAP device provided in April 2022  DME company: [employer]    Current PAP settings: 14-20  Current mask type: Dream wear FFM    Compliance data was obtained from the her device/DME company.    Continue PAP device on a regular basis, at least 4 hours or more per night.    Sleep hygiene measures were discussed    Vaccination status addressed.    Up-to-date with influenza vaccinations.     Declines pneumonia vaccinations.     It was recommended that the patient consider Prevnar-20 vaccination and discuss it with her PCP, if not already obtained.     For the vaccines, that she refused today, I have asked her to discuss this further with her PCP.      Dictated utilizing Dragon dictation.    This document was electronically signed by Dilcia Chávez MD on 04/29/24 at 12:20 EDT

## 2024-07-09 ENCOUNTER — OFFICE VISIT (OUTPATIENT)
Dept: OBSTETRICS AND GYNECOLOGY | Facility: CLINIC | Age: 59
End: 2024-07-09
Payer: MEDICARE

## 2024-07-09 VITALS
HEIGHT: 66 IN | SYSTOLIC BLOOD PRESSURE: 126 MMHG | BODY MASS INDEX: 32.14 KG/M2 | WEIGHT: 200 LBS | DIASTOLIC BLOOD PRESSURE: 68 MMHG

## 2024-07-09 DIAGNOSIS — N39.0 FREQUENT UTI: ICD-10-CM

## 2024-07-09 DIAGNOSIS — Z12.31 ENCOUNTER FOR SCREENING MAMMOGRAM FOR BREAST CANCER: ICD-10-CM

## 2024-07-09 DIAGNOSIS — Z01.411 ENCOUNTER FOR GYNECOLOGICAL EXAMINATION (GENERAL) (ROUTINE) WITH ABNORMAL FINDINGS: Primary | ICD-10-CM

## 2024-07-09 DIAGNOSIS — N95.2 POSTMENOPAUSAL ATROPHIC VAGINITIS: ICD-10-CM

## 2024-07-09 RX ORDER — METHENAMINE HIPPURATE 1000 MG/1
TABLET ORAL EVERY 12 HOURS SCHEDULED
COMMUNITY

## 2024-07-09 RX ORDER — HYDROXYZINE HYDROCHLORIDE 25 MG/1
TABLET, FILM COATED ORAL EVERY 24 HOURS
COMMUNITY

## 2024-07-09 RX ORDER — PENTOSAN POLYSULFATE SODIUM 100 MG/1
CAPSULE, GELATIN COATED ORAL EVERY 8 HOURS SCHEDULED
COMMUNITY

## 2024-07-09 RX ORDER — ESTRADIOL 0.1 MG/G
CREAM VAGINAL
Qty: 42.5 G | Refills: 6 | Status: SHIPPED | OUTPATIENT
Start: 2024-07-09

## 2024-07-09 RX ORDER — VIBEGRON 75 MG/1
1 TABLET, FILM COATED ORAL DAILY
COMMUNITY
Start: 2024-06-23

## 2024-07-09 RX ORDER — ESTRADIOL 0.1 MG/G
CREAM VAGINAL
COMMUNITY
Start: 2024-06-21 | End: 2024-07-09 | Stop reason: SDUPTHER

## 2024-07-09 RX ORDER — AMITRIPTYLINE HYDROCHLORIDE 25 MG/1
TABLET, FILM COATED ORAL
COMMUNITY

## 2024-07-09 RX ORDER — DIAZEPAM 10 MG/1
TABLET ORAL
COMMUNITY
Start: 2024-06-17

## 2024-07-10 NOTE — PROGRESS NOTES
Chief Complaint  Gynecologic Exam     History of Present Illness:  Patient is 58 y.o.  who presents to [facility] here for her annual examination.  Patient had her last Pap smear in .  She did have her mammogram in November.  She had a colonoscopy in  and is to repeat in 10 years.  She has been using her estrogen cream consistently.  She does report she has been having recurrent UTIs.  She did see Dr. Grayson.  She had a cystoscopy and stretching of her urethra.  She reports she is only had 1 infection since that time.  Patient is now on Elmiron as well as several other medications for her bladder.  She does have a follow-up.    History  Past Medical History:   Diagnosis Date    Abnormal PFTs     Anxiety     Asthma     Cervical disc disease     PT STATES BULGING DISKS IN NECK, HAS BEEN DOING PHYSICAL THERAPY AND STEROID SHOTS (PAST) FOR THIS    Chronic back pain     HX OF BACK SURGERY    Chronic narcotic use     Depression     Dysphagia     rare cervical dysphagia for steak    Elevated LFTs     Fibromyalgia     Foot pain     LEFT FOOT PAIN, DUE TO TORN TENDON    GERD (gastroesophageal reflux disease)     History of Papanicolaou smear of cervix 2015    NORMAL     HTN (hypertension)     Hypercholesteremia     Hypothyroidism     Joint pain     Miscarriage     Mixed stress and urge urinary incontinence     Morbid obesity      (normal spontaneous vaginal delivery)     x1 w/o complic    Osteoarthritis     Osteoporosis     Pelvic prolapse     Polycythemia     Psoriasis     Psoriatic arthritis     Sleep apnea     CPAP compliant - SET AT 16, RESPIRATORY NOTIFIED.    Stress incontinence     Urinary incontinence     Urinary tract infection     Varicella     Vitamin D deficiency     Wears glasses      Current Outpatient Medications on File Prior to Visit   Medication Sig Dispense Refill    diazePAM (VALIUM) 10 MG tablet TAKE 1 TABLET BY MOUTH 30 MINUTES BEFORE PROCEDURE       Gemtesa 75 MG tablet Take 1 tablet by mouth Daily.      albuterol sulfate HFA (Ventolin HFA) 108 (90 Base) MCG/ACT inhaler Inhale 2 puffs Every 4 (Four) Hours As Needed for Wheezing or Shortness of Air. 18 g 5    amitriptyline (ELAVIL) 25 MG tablet TAKE 1 TABLET BY MOUTH DAILY Oral for 90 Days      Elmiron 100 MG capsule Every 8 (Eight) Hours.      fexofenadine (Allegra Allergy) 180 MG tablet Take 1 tablet by mouth Daily. 30 tablet 1    gabapentin (NEURONTIN) 600 MG tablet 1 tablet 4 (Four) Times a Day.  0    HYDROcodone-acetaminophen (NORCO) 5-325 MG per tablet Take 1 tablet by mouth Every 6 (Six) Hours As Needed for moderate pain (4-6). 30 tablet 0    hydrOXYzine (ATARAX) 25 MG tablet Daily.      ipratropium-albuterol (DUO-NEB) 0.5-2.5 mg/3 ml nebulizer ipratropium 0.5 mg-albuterol 3 mg (2.5 mg base)/3 mL nebulization soln   USE 1 VIAL VIA NEBULIZER EVERY 6 HOURS AS NEEDED FOR WHEEZING OR SHORTNESS OF BREATH      ketoconazole (NIZORAL) 2 % cream       levothyroxine (SYNTHROID, LEVOTHROID) 100 MCG tablet levothyroxine 100 mcg tablet   TAKE 1 TABLET BY MOUTH EVERY DAY IN THE MORNING ON AN EMPTY STOMACH      lisinopril-hydrochlorothiazide (PRINZIDE,ZESTORETIC) 10-12.5 MG per tablet   0    methenamine (HIPREX) 1 g tablet Every 12 (Twelve) Hours.      mometasone (NASONEX) 50 MCG/ACT nasal spray 2 sprays into the nostril(s) as directed by provider Daily. 17 g 5    Multiple Vitamins-Minerals (MULTIVITAMIN ADULT PO) Take 1 tablet by mouth Daily.      NON FORMULARY OTC amberian for menopause      pantoprazole (PROTONIX) 40 MG EC tablet Take 1 tablet by mouth Daily.  0    Skyrizi 150 MG/ML solution prefilled syringe       solifenacin (VESICARE) 10 MG tablet Take 1 tablet by mouth Daily.      tiotropium bromide monohydrate (Spiriva Respimat) 2.5 MCG/ACT aerosol solution inhaler Inhale 2 puffs Daily. 12 g 2    tiZANidine (ZANAFLEX) 1 MG half tablet       triamcinolone (KENALOG) 0.1 % cream       venlafaxine XR (EFFEXOR-XR)  150 MG 24 hr capsule Take 1 capsule by mouth Daily.      VENTOLIN  (90 Base) MCG/ACT inhaler inhale 2 puffs by mouth every 4 to 6 hours if needed  0    vitamin B-12 (CYANOCOBALAMIN) 1000 MCG tablet Take 1 tablet by mouth Daily. OTC       No current facility-administered medications on file prior to visit.     Allergies   Allergen Reactions    Wellbutrin [Bupropion] Rash     Past Surgical History:   Procedure Laterality Date    CARDIAC CATHETERIZATION      NO INTERVENTION    COLONOSCOPY      PT STATES POLYPS REMOVED    DILATION AND CURETTAGE, DIAGNOSTIC / THERAPEUTIC      ENDOSCOPY N/A 03/10/2023    Procedure: ESOPHAGOGASTRODUODENOSCOPY WITH SAUCEDA;  Surgeon: Sebastian Truong MD;  Location:  [facility];  Service: Gastroenterology;  Laterality: N/A;    GASTRIC SLEEVE LAPAROSCOPIC N/A 2016    Procedure: GASTRIC SLEEVE LAPAROSCOPIC;  Surgeon: Garrett Palencia MD;  Location:  [facility];  Service:     KNEE ARTHROSCOPY Left 2008    LAPAROSCOPIC CHOLECYSTECTOMY  2006    no stones. [facility].    LAPAROSCOPIC TUBAL LIGATION      LUMBAR FUSION      L4-5 disc and hardware [facility]    TUBAL ABDOMINAL LIGATION      VAGINAL PROLAPSE REPAIR      Streched bladder    WISDOM TOOTH EXTRACTION       Family History   Problem Relation Age of Onset    Breast cancer Mother     Hypertension Mother     Heart attack Sister     Hypertension Sister     Sleep apnea Sister     Obesity Sister     Diabetes Sister     Hypertension Father     Hypertension Sister     Hypertension Sister     Hypertension Brother      Social History     Socioeconomic History    Marital status:    Tobacco Use    Smoking status: Former     Current packs/day: 0.00     Average packs/day: 1.5 packs/day for 32.0 years (48.0 ttl pk-yrs)     Types: Cigarettes, Electronic Cigarette     Start date: 2/15/1991     Quit date: 2/15/2023     Years since quittin.4    Smokeless tobacco: Never    Tobacco comments:     PT STATES SHE USES THE  "E-CIGARETTE OCCASIONALLY   Vaping Use    Vaping status: Every Day    Substances: Nicotine, Flavoring   Substance and Sexual Activity    Alcohol use: No    Drug use: No    Sexual activity: Not Currently     Partners: Male       Physical Examination:  Vital Signs: /68   Ht 166.4 cm (65.5\")   Wt 90.7 kg (200 lb)   BMI 32.78 kg/m²     General Appearance: alert, appears stated age, and cooperative  Breasts: Examined in supine position  Symmetric without masses or skin dimpling  Nipples normal without inversion, lesions or discharge  There are no palpable axillary nodes  Abdomen: no masses, no hepatomegaly, no splenomegaly, soft non-tender, no guarding, and no rebound tenderness  Pelvic: Clinical staff was present for exam; pelvic examination required for evaluation.  External genitalia:  normal appearance of the external genitalia including Bartholin's and [place]'s glands.  :  urethral meatus normal;  Vaginal: Atrophic changes noted  Cervix:  normal appearance.  Uterus:  normal size, shape and consistency.  Adnexa:  normal bimanual exam of the adnexa.  Pap smear done and specimen sent using Thin-Prep technique    Data Review:  The following data was reviewed by: Pita Hooker MD on 07/09/2024:     Labs:    Imaging:    Medical Records:  None    Assessment and Plan   1. Encounter for gynecological examination (general) (routine) with abnormal findings  Pap was done today.  If she does not receive the results of the Pap within 2 weeks  time, she was instructed to call to find out the results.  I explained to Mayuri that the recommendations for Pap smear interval in a low risk patient has lengthened to 3 years time if cytology alone normal or  5 years time if both cytology and HPV testing were normal.  I encouraged her to be seen yearly for a full physical exam including breast and pelvic exam even during the off years when PAP's will not be performed.   - LIQUID-BASED PAP SMEAR WITH HPV GENOTYPING IF ASCUS " ([facility])    2. Encounter for screening mammogram for breast cancer  It is recommended per ACOG, for women at average risk to start annual mammogram screening at the age of 40 until the age of 75 and an individualized decision be made for women after age 75.  She was encouraged to continue getting yearly mammograms.  She should report any palpable breast lump(s) or skin changes regardless of mammographic findings.  I explained to Mayuri that notification regarding her mammogram results will come from the center performing the study.  Our office will not be routinely calling with mammogram results.  It is her responsibility to make sure that the results from the mammogram are communicated to her by the breast center.  If she has any questions about the results, she is welcome to call our office anytime.  The patient reports she will schedule her mammogram.    Mayuri was counseled regarding having clinical breast exams and breast self-awareness.  Women aged 29-39 years of age should have clinical breast exams every 1-3 years and yearly aged 40 and older.  The patient was counseled regarding breast self-awareness focusing on having a sense of what is normal for her breasts so that she can tell if there are changes.  Even small changes should be reported to provider.   - Mammo Screening Digital Tomosynthesis Bilateral With CAD; Future    3. Postmenopausal atrophic vaginitis  Discussed various options for relief of atrophic vaginal symptoms related to menopause. Discussed local therapy for treatment of vaginal symptoms only.  Discussed the different formulation options including cream, ring, and tablets.  Discussed the low risk of systemic absorption in postmenopausal women with atrophy using 25 mcgs of estradiol on a daily basis.  Recommend low dose use 2-3x/wk for maintenance of treatment.  Other treatment options were discussed including the use of water-based and silicone-based vaginal lubricants and  moisturizers.  Also discussed was the FDA approved treatment option of ospemifene for moderate to severe dyspareunia.   - estradiol (ESTRACE) 0.1 MG/GM vaginal cream; Use 1/4 applicator twice weekly at bedtime.  Dispense: 42.5 g; Refill: 6    4. Frequent UTI  Prescriptions given for estrogen cream.  Patient is instructed to apply to the periurethral area.  She is to keep her follow-up appointment with Dr. Grayson as well.  - estradiol (ESTRACE) 0.1 MG/GM vaginal cream; Use 1/4 applicator twice weekly at bedtime.  Dispense: 42.5 g; Refill: 6    Follow Up/Instructions:  Follow up as noted.  Patient was given instructions and counseling regarding her condition or for health maintenance advice. Please see specific information pulled into the AVS if appropriate.     Note: Speech recognition transcription software may have been used to dictate portions of this document.  An attempt at proofreading has been made though minor errors in transcription may still be present.    This note was electronically signed.  Pita Hooker M.D.

## 2024-07-15 LAB — REF LAB TEST METHOD: NORMAL

## 2024-11-25 ENCOUNTER — TELEPHONE (OUTPATIENT)
Dept: PULMONOLOGY | Facility: CLINIC | Age: 59
End: 2024-11-25
Payer: MEDICARE

## 2024-11-25 NOTE — TELEPHONE ENCOUNTER
Patient was suppose to get a CT scan done at [facility]. After speaking with our [facility] upon investigation. [facility] had not received a referral.

## 2024-11-25 NOTE — TELEPHONE ENCOUNTER
----- Message from Maty LUCAS sent at 11/25/2024  2:41 PM EST -----  PER OLY AT [facility] PT DID NOT HAVE A SCAN THIS YEAR. THE ONE SCANNED IN IS FROM 2023.  ----- Message -----  From: Homa Martinez MA  Sent: 11/25/2024   1:55 PM EST  To: Ruben Roth    Will you check into this? In referral communication it says the report has been scanned into media but I only see the report from 2023.  ----- Message -----  From: Cristina Mcadams RegSched Rep  Sent: 10/10/2024   1:29 PM EST  To: Homa Martinez MA    Pt does not have a CT that was done at [facility]  ----- Message -----  From: Homa Martinez MA  Sent: 10/10/2024  10:35 AM EDT  To: Ruben Shen    Will you check into this? In referral communications there is note stating ct report received scanned into media but I don't see it.  ----- Message -----  From: SYSTEM  Sent: 8/30/2024   1:17 AM EDT  To: Rogers Alejandro [facility] Clinical [place]

## 2025-01-28 ENCOUNTER — TELEPHONE (OUTPATIENT)
Dept: PULMONOLOGY | Facility: CLINIC | Age: 60
End: 2025-01-28
Payer: MEDICARE

## 2025-02-25 RX ORDER — ALBUTEROL SULFATE 90 UG/1
2 INHALANT RESPIRATORY (INHALATION) EVERY 4 HOURS PRN
Qty: 18 G | Refills: 5 | OUTPATIENT
Start: 2025-02-25

## 2025-02-26 RX ORDER — ALBUTEROL SULFATE 90 UG/1
2 INHALANT RESPIRATORY (INHALATION) EVERY 4 HOURS PRN
Qty: 18 G | Refills: 5 | OUTPATIENT
Start: 2025-02-26

## 2025-03-12 ENCOUNTER — OFFICE VISIT (OUTPATIENT)
Dept: PULMONOLOGY | Facility: CLINIC | Age: 60
End: 2025-03-12
Payer: MEDICARE

## 2025-03-12 VITALS
RESPIRATION RATE: 18 BRPM | HEIGHT: 66 IN | WEIGHT: 201.4 LBS | DIASTOLIC BLOOD PRESSURE: 80 MMHG | BODY MASS INDEX: 32.37 KG/M2 | OXYGEN SATURATION: 98 % | HEART RATE: 80 BPM | SYSTOLIC BLOOD PRESSURE: 128 MMHG

## 2025-03-12 DIAGNOSIS — J44.9 CHRONIC OBSTRUCTIVE PULMONARY DISEASE, UNSPECIFIED COPD TYPE: Primary | ICD-10-CM

## 2025-03-12 DIAGNOSIS — E66.9 OBESITY (BMI 30-39.9): ICD-10-CM

## 2025-03-12 DIAGNOSIS — Z87.891 PERSONAL HISTORY OF NICOTINE DEPENDENCE: ICD-10-CM

## 2025-03-12 DIAGNOSIS — J30.89 OTHER ALLERGIC RHINITIS: ICD-10-CM

## 2025-03-12 DIAGNOSIS — G47.33 OBSTRUCTIVE SLEEP APNEA: ICD-10-CM

## 2025-03-12 PROCEDURE — 99214 OFFICE O/P EST MOD 30 MIN: CPT | Performed by: NURSE PRACTITIONER

## 2025-03-12 PROCEDURE — 3079F DIAST BP 80-89 MM HG: CPT | Performed by: NURSE PRACTITIONER

## 2025-03-12 PROCEDURE — 3074F SYST BP LT 130 MM HG: CPT | Performed by: NURSE PRACTITIONER

## 2025-03-12 RX ORDER — FLUTICASONE PROPIONATE 50 MCG
1 SPRAY, SUSPENSION (ML) NASAL DAILY
Qty: 16 G | Refills: 6 | Status: SHIPPED | OUTPATIENT
Start: 2025-03-12

## 2025-03-12 NOTE — PROGRESS NOTES
"Chief Complaint   Patient presents with    Sleeping Problem    Follow-up         Subjective   Mayuri Watts is a 59 y.o. female.   The patient comes in today for follow-up of shortness of breath and obstructive sleep apnea.    Symptoms have been stable since the last clinic visit. Patient reports no recent exacerbations.     Patient is using medications, as prescribed. She uses Spiriva daily and WENDIE once every other day.     She complains of some intermittent nasal congestion.    She has not used neb in 6 months.     Exercise tolerance has also remained stable. She does not feel SOB has worsened.     Quit smoking cigarettes 2 years ago.  She continues to vape containing nicotine.    She uses CPAP but states she fights with the mask all night. She takes her sleeping pill and falls asleep before starting CPAP therapy.       The following portions of the patient's history were reviewed and updated as appropriate: allergies, current medications, past family history, past medical history, past social history, and past surgical history.    Review of Systems   HENT:  Negative for sinus pressure, sneezing and sore throat.    Respiratory:  Positive for cough. Negative for chest tightness, shortness of breath and wheezing.    Psychiatric/Behavioral:  Positive for sleep disturbance.        Objective   Visit Vitals  /80   Pulse 80   Resp 18   Ht 166.4 cm (65.51\")   Wt 91.4 kg (201 lb 6.4 oz)   SpO2 98%   BMI 32.99 kg/m²       Physical Exam  Vitals reviewed.   HENT:      Head: Atraumatic.      Mouth/Throat:      Mouth: Mucous membranes are moist.      Comments: Crowded oropharynx.   Eyes:      Extraocular Movements: Extraocular movements intact.   Cardiovascular:      Rate and Rhythm: Normal rate and regular rhythm.   Pulmonary:      Effort: Pulmonary effort is normal. No respiratory distress.      Comments: Somewhat decreased A/E without wheezing.   Abdominal:      Comments: Obese abdomen.    Skin:     General: Skin " is warm.   Neurological:      Mental Status: She is alert and oriented to person, place, and time.           Assessment & Plan   Diagnoses and all orders for this visit:    1. Chronic obstructive pulmonary disease, unspecified COPD type (Primary)    2. Obstructive sleep apnea    3. Personal history of nicotine dependence  -      CT Chest Low Dose Cancer Screening WO; Future    4. Obesity (BMI 30-39.9)    5. Other allergic rhinitis    Other orders  -     fluticasone (FLONASE) 50 MCG/ACT nasal spray; Administer 1 spray into the nostril(s) as directed by provider Daily. Administer 1 spray in each nostril for each dose.  Dispense: 16 g; Refill: 6           Return in about 9 months (around 12/12/2025) for Recheck, For Dr. Chávez, Imaging studies.    DISCUSSION (if any):  PFT 2023 consistent with mild obstruction.     No change to the current medications has been made.  Continue Spiriva and the rescue medication as directed.      I have prescribed a nasal spray for continued nasal congestion.    Compliance with medications stressed.     Side effects of prescribed medications discussed with the patient    Patient was strongly encouraged to quit smoking as soon as possible.    The patient belongs to the risk group for which lung cancer screening has been recommended. She is overdue for annual LDCT since she did not have CT in 2024 which was ordered at her last visit. I have ordered LDCT today for [facility].          Sleep study performed in Jan 2022  AHI was 7.2 / hour.      Latest PAP device provided in April 2022  DME company: [employer]     Current PAP settings: 14-20  Current mask type: Dream wear FFM, modified    Continue treatment with AutoPAP at a pressure of 14-20, with a full-face mask.    Patient's compliance data was reviewed and she is not compliant at this time.  Humidification setup, hose and mask care discussed.    Weight loss advised.    Use every night for at least 4 hours stressed.      Dictated utilizing Dragon dictation.    This document was electronically signed by KORIN Vaughan March 12, 2025  10:23 EDT

## 2025-03-25 ENCOUNTER — TELEPHONE (OUTPATIENT)
Dept: PULMONOLOGY | Facility: CLINIC | Age: 60
End: 2025-03-25
Payer: MEDICARE

## 2025-03-25 DIAGNOSIS — Z87.891 PERSONAL HISTORY OF NICOTINE DEPENDENCE: ICD-10-CM

## 2025-03-31 ENCOUNTER — PATIENT MESSAGE (OUTPATIENT)
Dept: OBSTETRICS AND GYNECOLOGY | Facility: CLINIC | Age: 60
End: 2025-03-31
Payer: MEDICARE

## 2025-04-15 ENCOUNTER — OFFICE VISIT (OUTPATIENT)
Dept: BARIATRICS/WEIGHT MGMT | Facility: CLINIC | Age: 60
End: 2025-04-15
Payer: MEDICARE

## 2025-04-15 VITALS
HEART RATE: 80 BPM | WEIGHT: 201.8 LBS | RESPIRATION RATE: 18 BRPM | HEIGHT: 66 IN | DIASTOLIC BLOOD PRESSURE: 74 MMHG | BODY MASS INDEX: 32.43 KG/M2 | TEMPERATURE: 97.8 F | OXYGEN SATURATION: 97 % | SYSTOLIC BLOOD PRESSURE: 122 MMHG

## 2025-04-15 DIAGNOSIS — Z90.3 POSTGASTRECTOMY MALABSORPTION: ICD-10-CM

## 2025-04-15 DIAGNOSIS — K21.9 GASTROESOPHAGEAL REFLUX DISEASE, UNSPECIFIED WHETHER ESOPHAGITIS PRESENT: ICD-10-CM

## 2025-04-15 DIAGNOSIS — K91.2 POSTGASTRECTOMY MALABSORPTION: ICD-10-CM

## 2025-04-15 DIAGNOSIS — E55.9 VITAMIN D DEFICIENCY: ICD-10-CM

## 2025-04-15 DIAGNOSIS — E66.811 OBESITY, CLASS I, BMI 30-34.9: Primary | ICD-10-CM

## 2025-04-15 DIAGNOSIS — R10.13 DYSPEPSIA: ICD-10-CM

## 2025-04-15 DIAGNOSIS — R53.83 FATIGUE, UNSPECIFIED TYPE: ICD-10-CM

## 2025-04-15 NOTE — PROGRESS NOTES
"[facility]ariatric Surgery  [address]  [address]  [facility] [id number] [phone]      Patient Name:  Mayuri Watts.  :  1965      Date of Visit: 04/15/2025      Reason for Visit:  dysphagia, reflux      HPI: Mayuri Watts is a 59 y.o. female s/p LSG 16 GDW     LOV 2018.  Moved to [place].  Has since returned to [place], reports worsening \"stomach\"/reflux issues.  PCP sent her to Dr. Truong for eval, but was ultimately advised to f/up w/ Dr. Palencia for additional eval, says she just put it off, however symptoms persist.     Reports worsening reflux and dysphagia for the last few years.  Has associated N/V/AP, despite taking daily Protonix.  Hx chronic constipation, bowels move q5-7 days.  Last CLN in [facility], reportedly unremarkable.  Remote hx lap stiven.  Denies ASA/NSAIDS/GLP1s.      Prior Eval includes:     EGD 3/10/23 w/ Dr. Truong - small sliding HH noted.  Gastric Bx benign.      Bravo pH study 3/10/23 @[facility] 23.9 c/w GERD.      No recent bariatric labs.    Says would like to lose some additional weight as well.      Diet Recall:   B - egg,ham,cheese scramble  L - cheese crackers  D - meatloaf    Struggles drinking water, feels choked, drinks [place] Yellow w/out issue.      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      Presurgery weight: 345 lbs. Today's weight is 91.5 kg (201 lb 12.8 oz) pounds, today's BMI is Body mass index is 33.07 kg/m². and her weight loss since surgery is 144 pounds.       Past Medical History:   Diagnosis Date    Abnormal PFTs     Anxiety     Asthma     Cervical disc disease     PT STATES BULGING DISKS IN NECK, HAS BEEN DOING PHYSICAL THERAPY AND STEROID SHOTS (PAST) FOR THIS    Chronic back pain     HX OF BACK SURGERY    Chronic narcotic use     Depression     Dysphagia     rare cervical dysphagia for steak    Elevated LFTs     Fibromyalgia     Foot pain     LEFT FOOT PAIN, DUE TO TORN TENDON    Frequent UTI "     Urology eval (P)    GERD (gastroesophageal reflux disease)     History of Papanicolaou smear of cervix 2015    NORMAL     HTN (hypertension)     Hypercholesteremia     Hypothyroidism     Joint pain     Miscarriage     Mixed stress and urge urinary incontinence     Morbid obesity      (normal spontaneous vaginal delivery)     x1 w/o complic    Osteoarthritis     Osteoporosis     Pelvic prolapse     Polycythemia     Psoriasis     Psoriatic arthritis     Sleep apnea     CPAP compliant - SET AT 16, RESPIRATORY NOTIFIED.    Stress incontinence     Urinary incontinence     Varicella     Vitamin D deficiency     Wears glasses      Past Surgical History:   Procedure Laterality Date    CARDIAC CATHETERIZATION      NO INTERVENTION    COLONOSCOPY      PT STATES POLYPS REMOVED    DILATION AND CURETTAGE, DIAGNOSTIC / THERAPEUTIC      ENDOSCOPY N/A 03/10/2023    Procedure: ESOPHAGOGASTRODUODENOSCOPY WITH SAUCEDA;  Surgeon: Sebastian Truong MD;  Location:  [facility];  Service: Gastroenterology;  Laterality: N/A;    GASTRIC SLEEVE LAPAROSCOPIC N/A 2016    Procedure: GASTRIC SLEEVE LAPAROSCOPIC;  Surgeon: Garrett Palencia MD;  Location:  [facility];  Service:     KNEE ARTHROSCOPY Left 2008    LAPAROSCOPIC CHOLECYSTECTOMY      no stones. [place].    LAPAROSCOPIC TUBAL LIGATION      LUMBAR FUSION      L4-5 disc and hardware [facility]    TUBAL ABDOMINAL LIGATION      WISDOM TOOTH EXTRACTION       Outpatient Medications Marked as Taking for the 4/15/25 encounter (Office Visit) with Yahaira Isidro PA   Medication Sig Dispense Refill    albuterol sulfate HFA (Ventolin HFA) 108 (90 Base) MCG/ACT inhaler Inhale 2 puffs Every 4 (Four) Hours As Needed for Wheezing or Shortness of Air. 18 g 5    amitriptyline (ELAVIL) 50 MG tablet Take 1 tablet by mouth Every Night.      Elmiron 100 MG capsule Take 1 capsule by mouth 3 (Three) Times a Day Before Meals.      estradiol (ESTRACE) 0.1 MG/GM  vaginal cream Use 1/4 applicator twice weekly at bedtime. (Patient taking differently: Insert 2 g into the vagina Every 7 (Seven) Days.) 42.5 g 6    fluticasone (FLONASE) 50 MCG/ACT nasal spray Administer 1 spray into the nostril(s) as directed by provider Daily. Administer 1 spray in each nostril for each dose. (Patient taking differently: Administer 1 spray into the nostril(s) as directed by provider As Needed. Administer 1 spray in each nostril for each dose.) 16 g 6    gabapentin (NEURONTIN) 600 MG tablet Take 1 tablet by mouth 3 (Three) Times a Day.  0    HYDROcodone-acetaminophen (NORCO) 5-325 MG per tablet Take 1 tablet by mouth Every 6 (Six) Hours As Needed for moderate pain (4-6). 30 tablet 0    hydrOXYzine (ATARAX) 50 MG tablet Take 1 tablet by mouth Daily.      ipratropium-albuterol (DUO-NEB) 0.5-2.5 mg/3 ml nebulizer ipratropium 0.5 mg-albuterol 3 mg (2.5 mg base)/3 mL nebulization soln   USE 1 VIAL VIA NEBULIZER EVERY 6 HOURS AS NEEDED FOR WHEEZING OR SHORTNESS OF BREATH      ketoconazole (NIZORAL) 2 % cream Apply 1 Application topically to the appropriate area as directed As Needed.      levothyroxine (SYNTHROID, LEVOTHROID) 100 MCG tablet Take 1 tablet by mouth Every Morning.      lisinopril-hydrochlorothiazide (PRINZIDE,ZESTORETIC) 10-12.5 MG per tablet   0    methenamine (HIPREX) 1 g tablet Take 1 tablet by mouth 2 (Two) Times a Day With Meals.      Multiple Vitamins-Minerals (MULTIVITAMIN ADULT PO) Take 1 tablet by mouth Daily.      NON FORMULARY OTC amberian for menopause      pantoprazole (PROTONIX) 40 MG EC tablet Take 1 tablet by mouth Daily.  0    solifenacin (VESICARE) 5 MG tablet Take 1 tablet by mouth Daily.      tiotropium bromide monohydrate (Spiriva Respimat) 2.5 MCG/ACT aerosol solution inhaler Inhale 2 puffs Daily. 12 g 2    tiZANidine (ZANAFLEX) 1 MG half tablet Take 4 half tablet by mouth 2 (Two) Times a Day.      triamcinolone (KENALOG) 0.1 % cream Apply 1 Application topically to  "the appropriate area as directed As Needed.      venlafaxine XR (EFFEXOR-XR) 150 MG 24 hr capsule Take 1 capsule by mouth Daily.      vitamin B-12 (CYANOCOBALAMIN) 1000 MCG tablet Take 1 tablet by mouth Daily. OTC       Allergies   Allergen Reactions    Wellbutrin [Bupropion] Rash     Social History     Socioeconomic History    Marital status:    Tobacco Use    Smoking status: Former     Current packs/day: 0.00     Average packs/day: 1.5 packs/day for 32.0 years (48.0 ttl pk-yrs)     Types: Cigarettes, Electronic Cigarette     Start date: 2/15/1991     Quit date: 2/15/2023     Years since quittin.1    Smokeless tobacco: Never    Tobacco comments:     PT STATES SHE USES THE E-CIGARETTE OCCASIONALLY   Vaping Use    Vaping status: Every Day    Substances: Nicotine, Flavoring   Substance and Sexual Activity    Alcohol use: No    Drug use: No    Sexual activity: Not Currently     Partners: Male       /74 (BP Location: Left arm, Patient Position: Sitting)   Pulse 80   Temp 97.8 °F (36.6 °C)   Resp 18   Ht 166.4 cm (65.5\")   Wt 91.5 kg (201 lb 12.8 oz)   SpO2 97%   BMI 33.07 kg/m²     Physical Exam  Constitutional:       Appearance: She is well-developed.   Eyes:      General: No scleral icterus.  Cardiovascular:      Rate and Rhythm: Normal rate.   Pulmonary:      Effort: Pulmonary effort is normal.   Abdominal:      Palpations: Abdomen is soft.      Tenderness: There is no abdominal tenderness.      Hernia: No hernia is present.   Musculoskeletal:         General: Normal range of motion.   Skin:     General: Skin is warm and dry.      Findings: No rash.   Neurological:      Mental Status: She is alert.   Psychiatric:         Behavior: Behavior normal. Behavior is cooperative.         Judgment: Judgment normal.           Assessment:  8+ years s/p LSG 16 GDW       ICD-10-CM ICD-9-CM   1. Obesity, Class I, BMI 30-34.9  E66.811 278.00   2. Gastroesophageal reflux disease, unspecified whether " esophagitis present  K21.9 530.81   3. Postgastrectomy malabsorption  K91.2 579.3    Z90.3    4. Fatigue, unspecified type  R53.83 780.79   5. Dyspepsia  R10.13 536.8   6. Vitamin D deficiency  E55.9 268.9     BMI is >= 30 and <35. (Class 1 Obesity). The following options were offered after discussion;: see plan.      Plan:  Labs + UGI to further evaluate.  Additional input to follow.        The patient was instructed to follow up pending results, sooner if needed.

## 2025-04-18 ENCOUNTER — PATIENT ROUNDING (BHMG ONLY) (OUTPATIENT)
Dept: BARIATRICS/WEIGHT MGMT | Facility: CLINIC | Age: 60
End: 2025-04-18
Payer: MEDICARE

## 2025-04-18 NOTE — PROGRESS NOTES
A [employer] message has been sent to the patient for PATIENT ROUNDING for [facility].

## 2025-04-19 LAB
25(OH)D3+25(OH)D2 SERPL-MCNC: 33.5 NG/ML (ref 30–100)
ALBUMIN SERPL-MCNC: 4.1 G/DL (ref 3.8–4.9)
ALP SERPL-CCNC: 97 IU/L (ref 44–121)
ALT SERPL-CCNC: 16 IU/L (ref 0–32)
AST SERPL-CCNC: 21 IU/L (ref 0–40)
BASOPHILS # BLD AUTO: 0.1 X10E3/UL (ref 0–0.2)
BASOPHILS NFR BLD AUTO: 1 %
BILIRUB SERPL-MCNC: <0.2 MG/DL (ref 0–1.2)
BUN SERPL-MCNC: 14 MG/DL (ref 8–27)
BUN/CREAT SERPL: 19 (ref 12–28)
CALCIUM SERPL-MCNC: 9.8 MG/DL (ref 8.7–10.3)
CHLORIDE SERPL-SCNC: 104 MMOL/L (ref 96–106)
CO2 SERPL-SCNC: 24 MMOL/L (ref 20–29)
CREAT SERPL-MCNC: 0.73 MG/DL (ref 0.57–1)
EGFRCR SERPLBLD CKD-EPI 2021: 94 ML/MIN/1.73
EOSINOPHIL # BLD AUTO: 0.2 X10E3/UL (ref 0–0.4)
EOSINOPHIL NFR BLD AUTO: 3 %
ERYTHROCYTE [DISTWIDTH] IN BLOOD BY AUTOMATED COUNT: 15.1 % (ref 11.7–15.4)
FERRITIN SERPL-MCNC: 15 NG/ML (ref 15–150)
FOLATE SERPL-MCNC: >20 NG/ML
GLOBULIN SER CALC-MCNC: 2.9 G/DL (ref 1.5–4.5)
GLUCOSE SERPL-MCNC: 63 MG/DL (ref 70–99)
HCT VFR BLD AUTO: 40.3 % (ref 34–46.6)
HGB BLD-MCNC: 13.3 G/DL (ref 11.1–15.9)
IMM GRANULOCYTES # BLD AUTO: 0 X10E3/UL (ref 0–0.1)
IMM GRANULOCYTES NFR BLD AUTO: 0 %
IRON SERPL-MCNC: 72 UG/DL (ref 27–159)
LYMPHOCYTES # BLD AUTO: 1.9 X10E3/UL (ref 0.7–3.1)
LYMPHOCYTES NFR BLD AUTO: 36 %
MCH RBC QN AUTO: 28.4 PG (ref 26.6–33)
MCHC RBC AUTO-ENTMCNC: 33 G/DL (ref 31.5–35.7)
MCV RBC AUTO: 86 FL (ref 79–97)
METHYLMALONATE SERPL-SCNC: 193 NMOL/L (ref 0–378)
MONOCYTES # BLD AUTO: 0.6 X10E3/UL (ref 0.1–0.9)
MONOCYTES NFR BLD AUTO: 11 %
NEUTROPHILS # BLD AUTO: 2.6 X10E3/UL (ref 1.4–7)
NEUTROPHILS NFR BLD AUTO: 49 %
PLATELET # BLD AUTO: 231 X10E3/UL (ref 150–450)
POTASSIUM SERPL-SCNC: 4.1 MMOL/L (ref 3.5–5.2)
PREALB SERPL-MCNC: 19 MG/DL (ref 10–36)
PROT SERPL-MCNC: 7 G/DL (ref 6–8.5)
RBC # BLD AUTO: 4.68 X10E6/UL (ref 3.77–5.28)
SODIUM SERPL-SCNC: 145 MMOL/L (ref 134–144)
VIT B1 BLD-SCNC: 113.1 NMOL/L (ref 66.5–200)
WBC # BLD AUTO: 5.4 X10E3/UL (ref 3.4–10.8)

## 2025-06-02 ENCOUNTER — HOSPITAL ENCOUNTER (OUTPATIENT)
Dept: GENERAL RADIOLOGY | Facility: HOSPITAL | Age: 60
Discharge: HOME | End: 2025-06-02
Admitting: PHYSICIAN ASSISTANT
Payer: MEDICARE

## 2025-06-02 DIAGNOSIS — K21.9 GASTROESOPHAGEAL REFLUX DISEASE, UNSPECIFIED WHETHER ESOPHAGITIS PRESENT: ICD-10-CM

## 2025-06-02 PROCEDURE — 74240 X-RAY XM UPR GI TRC 1CNTRST: CPT

## 2025-06-23 ENCOUNTER — TELEMEDICINE (OUTPATIENT)
Dept: BARIATRICS/WEIGHT MGMT | Facility: CLINIC | Age: 60
End: 2025-06-23
Payer: MEDICARE

## 2025-06-23 VITALS — BODY MASS INDEX: 33.11 KG/M2 | HEIGHT: 66 IN | WEIGHT: 206 LBS

## 2025-06-23 DIAGNOSIS — E66.811 OBESITY, CLASS I, BMI 30-34.9: Primary | ICD-10-CM

## 2025-06-23 DIAGNOSIS — K21.9 GASTROESOPHAGEAL REFLUX DISEASE, UNSPECIFIED WHETHER ESOPHAGITIS PRESENT: ICD-10-CM

## 2025-06-23 PROCEDURE — 99214 OFFICE O/P EST MOD 30 MIN: CPT

## 2025-06-23 NOTE — PROGRESS NOTES
"[facility]ariat[facility]  [address]  [address]  [facility] [id number] [phone]      Patient Name:  Mayuri Watts.  :  1965      Date of Visit: 2025        Reason for Visit: Having issues, dysphagia, reflux      HPI: Mayuri Watts is a 59 y.o. female s/p LSG 16 GDW     LOV 2018.  Moved to [place].  Has since returned to [place], reports worsening \"stomach\"/reflux issues.  PCP sent her to Dr. Truong for eval, but was ultimately advised to f/up w/ Dr. Palencia for additional eval, says she just put it off, however symptoms persist.     Reports worsening reflux and dysphagia for the last few years.  Has associated N/V/AP, despite taking daily Protonix.  Hx chronic constipation, bowels move q5-7 days.  Last CLN in [facility], reportedly unremarkable.  Remote hx lap stiven.  Denies ASA/NSAIDS/GLP1s.      Prior Eval includes:     EGD 3/10/23 w/ Dr. Truong - small sliding HH noted.  Gastric Bx benign.      [facility]      -------  Update 2025    Upper GI R 4/15/2025  IMPRESSION:  1. Cervical esophageal web causing some narrowing of upper esophagus.  Consider EGD for further evaluation.  2. Large hiatal hernia.  3. Significant gastroesophageal reflux to the thoracic inlet.  4. Significant dysmotility.    Today, patient is doing well. Denies changes in medical history. No new medications or hospitalizations reported. No GLP-1 use. Denies recent ASA, NSAIDs, steroids, tobacco use/exposure.        Past Medical History:   Diagnosis Date    Abnormal PFTs     Anxiety     Asthma     Cervical disc disease     PT STATES BULGING DISKS IN NECK, HAS BEEN DOING PHYSICAL THERAPY AND STEROID SHOTS (PAST) FOR THIS    Chronic back pain     HX OF BACK SURGERY    Chronic narcotic use     Depression     Dysphagia     rare cervical dysphagia for steak    Elevated LFTs     Fibromyalgia     Foot pain     LEFT FOOT PAIN, DUE TO TORN TENDON "    Frequent UTI     Urology eval (P)    GERD (gastroesophageal reflux disease)     History of Papanicolaou smear of cervix 2015    NORMAL     HTN (hypertension)     Hypercholesteremia     Hypothyroidism     Joint pain     Miscarriage     Mixed stress and urge urinary incontinence     Morbid obesity      (normal spontaneous vaginal delivery)     x1 w/o complic    Osteoarthritis     Osteoporosis     Pelvic prolapse     Polycythemia     Psoriasis     Psoriatic arthritis     Sleep apnea     CPAP compliant - SET AT 16, RESPIRATORY NOTIFIED.    Stress incontinence     Urinary incontinence     Varicella     Vitamin D deficiency     Wears glasses      Past Surgical History:   Procedure Laterality Date    CARDIAC CATHETERIZATION      NO INTERVENTION    COLONOSCOPY      PT STATES POLYPS REMOVED    DILATION AND CURETTAGE, DIAGNOSTIC / THERAPEUTIC      ENDOSCOPY N/A 03/10/2023    Procedure: ESOPHAGOGASTRODUODENOSCOPY WITH SAUCEDA;  Surgeon: Sebastian Truong MD;  Location:  [facility];  Service: Gastroenterology;  Laterality: N/A;    GASTRIC SLEEVE LAPAROSCOPIC N/A 2016    Procedure: GASTRIC SLEEVE LAPAROSCOPIC;  Surgeon: Garrett Palencia MD;  Location:  [facility];  Service:     KNEE ARTHROSCOPY Left 2008    LAPAROSCOPIC CHOLECYSTECTOMY      no stones. [facility].    LAPAROSCOPIC TUBAL LIGATION      LUMBAR FUSION      L4-5 disc and hardware [facility]    TUBAL ABDOMINAL LIGATION      WISDOM TOOTH EXTRACTION       Outpatient Medications Marked as Taking for the 25 encounter (Telemedicine) with Fabián Mantilla APRN   Medication Sig Dispense Refill    albuterol sulfate HFA (Ventolin HFA) 108 (90 Base) MCG/ACT inhaler Inhale 2 puffs Every 4 (Four) Hours As Needed for Wheezing or Shortness of Air. 18 g 5    amitriptyline (ELAVIL) 50 MG tablet Take 1 tablet by mouth Every Night.      estradiol (ESTRACE) 0.1 MG/GM vaginal cream Use 1/4 applicator twice weekly at bedtime. (Patient taking  differently: Insert 2 g into the vagina Every 7 (Seven) Days.) 42.5 g 6    fluticasone (FLONASE) 50 MCG/ACT nasal spray Administer 1 spray into the nostril(s) as directed by provider Daily. Administer 1 spray in each nostril for each dose. (Patient taking differently: Administer 1 spray into the nostril(s) as directed by provider As Needed. Administer 1 spray in each nostril for each dose.) 16 g 6    gabapentin (NEURONTIN) 600 MG tablet Take 1 tablet by mouth 3 (Three) Times a Day.  0    HYDROcodone-acetaminophen (NORCO) 5-325 MG per tablet Take 1 tablet by mouth Every 6 (Six) Hours As Needed for moderate pain (4-6). 30 tablet 0    hydrOXYzine (ATARAX) 50 MG tablet Take 1 tablet by mouth Daily.      ipratropium-albuterol (DUO-NEB) 0.5-2.5 mg/3 ml nebulizer ipratropium 0.5 mg-albuterol 3 mg (2.5 mg base)/3 mL nebulization soln   USE 1 VIAL VIA NEBULIZER EVERY 6 HOURS AS NEEDED FOR WHEEZING OR SHORTNESS OF BREATH      ketoconazole (NIZORAL) 2 % cream Apply 1 Application topically to the appropriate area as directed As Needed.      levothyroxine (SYNTHROID, LEVOTHROID) 100 MCG tablet Take 1 tablet by mouth Every Morning.      lisinopril-hydrochlorothiazide (PRINZIDE,ZESTORETIC) 10-12.5 MG per tablet   0    Multiple Vitamins-Minerals (MULTIVITAMIN ADULT PO) Take 1 tablet by mouth Daily.      pantoprazole (PROTONIX) 40 MG EC tablet Take 1 tablet by mouth Daily.  0    Skyrizi 150 MG/ML solution prefilled syringe       tiotropium bromide monohydrate (Spiriva Respimat) 2.5 MCG/ACT aerosol solution inhaler Inhale 2 puffs Daily. 12 g 2    triamcinolone (KENALOG) 0.1 % cream Apply 1 Application topically to the appropriate area as directed As Needed.      venlafaxine XR (EFFEXOR-XR) 150 MG 24 hr capsule Take 1 capsule by mouth Daily.      vitamin B-12 (CYANOCOBALAMIN) 1000 MCG tablet Take 1 tablet by mouth Daily. OTC       Allergies   Allergen Reactions    Wellbutrin [Bupropion] Rash     Social History     Socioeconomic  "History    Marital status:    Tobacco Use    Smoking status: Former     Current packs/day: 0.00     Average packs/day: 1.5 packs/day for 32.0 years (48.0 ttl pk-yrs)     Types: Cigarettes, Electronic Cigarette     Start date: 2/15/1991     Quit date: 2/15/2023     Years since quittin.3    Smokeless tobacco: Never    Tobacco comments:     PT STATES SHE USES THE E-CIGARETTE OCCASIONALLY   Vaping Use    Vaping status: Every Day    Substances: Nicotine, Flavoring   Substance and Sexual Activity    Alcohol use: No    Drug use: No    Sexual activity: Not Currently     Partners: Male       Ht 166.4 cm (65.5\")   Wt 93.4 kg (206 lb)   BMI 33.76 kg/m²     LOV 4/15/2025  Physical Exam  Constitutional:       Appearance: She is well-developed.   Eyes:      General: No scleral icterus.  Cardiovascular:      Rate and Rhythm: Normal rate.   Pulmonary:      Effort: Pulmonary effort is normal.   Abdominal:      Palpations: Abdomen is soft.      Tenderness: There is no abdominal tenderness.      Hernia: No hernia is present.   Musculoskeletal:         General: Normal range of motion.   Skin:     General: Skin is warm and dry.      Findings: No rash.   Neurological:      Mental Status: She is alert.   Psychiatric:         Behavior: Behavior normal. Behavior is cooperative.         Judgment: Judgment normal.       Assessment:  8+ years s/p LSG 16 GDW       ICD-10-CM ICD-9-CM   1. Obesity, Class I, BMI 30-34.9  E66.811 278.00   2. Gastroesophageal reflux disease, unspecified whether esophagitis present  K21.9 530.81       BMI is >= 30 and <35. (Class 1 Obesity). The following options were offered after discussion;: see plan.      Plan:  Will schedule EGD @ [facility] w/ Dr. Palencia for further evaluation. Additional input to follow.      Reiterated an all liquid diet 24 hours prior to surgery.     Maintain NPO status after midnight..     The risks and benefits of the upper endoscopy were discussed with the patient in detail " and all questions were answered.  Possibility of perforation, bleeding, aspiration, and anesthesia reaction were reviewed.  Patient agrees to proceed.    Call w/ issues/concerns.    Note: This was an audio and video enabled telemedicine encounter conducted via [employer].  Patient is located in [place].  Provider is at the office. Consent was obtained prior to the visit.     I spent 30 minutes caring for Mayuri on this date of service. This time includes time spent by me in the following activities: preparing for the visit, reviewing tests, documenting information in the medical record, and care coordination.

## 2025-06-30 DIAGNOSIS — J44.9 CHRONIC OBSTRUCTIVE PULMONARY DISEASE, UNSPECIFIED COPD TYPE: Primary | ICD-10-CM

## 2025-06-30 RX ORDER — TIOTROPIUM BROMIDE INHALATION SPRAY 3.12 UG/1
2 SPRAY, METERED RESPIRATORY (INHALATION) DAILY
Qty: 12 G | Refills: 2 | Status: SHIPPED | OUTPATIENT
Start: 2025-06-30

## 2025-07-07 ENCOUNTER — OUTSIDE FACILITY SERVICE (OUTPATIENT)
Dept: BARIATRICS/WEIGHT MGMT | Facility: CLINIC | Age: 60
End: 2025-07-07
Payer: MEDICARE

## 2025-07-07 PROCEDURE — 43239 EGD BIOPSY SINGLE/MULTIPLE: CPT | Performed by: SURGERY

## 2025-07-07 PROCEDURE — 88305 TISSUE EXAM BY PATHOLOGIST: CPT | Performed by: SURGERY

## 2025-07-08 ENCOUNTER — TELEPHONE (OUTPATIENT)
Dept: BARIATRICS/WEIGHT MGMT | Facility: CLINIC | Age: 60
End: 2025-07-08
Payer: MEDICARE

## 2025-07-08 ENCOUNTER — LAB REQUISITION (OUTPATIENT)
Dept: LAB | Facility: HOSPITAL | Age: 60
End: 2025-07-08
Payer: MEDICARE

## 2025-07-08 DIAGNOSIS — K30 FUNCTIONAL DYSPEPSIA: ICD-10-CM

## 2025-07-08 NOTE — TELEPHONE ENCOUNTER
----- Message from Garrett Palencia sent at 7/7/2025 11:32 AM EDT -----  Please schedule her for a laparoscopic hiatal hernia repair and EGD if she is interested.      Normally I would discuss options but as I discussed with her sister who is with her today she is not really a candidate for gastric bypass with her ongoing nicotine use.

## 2025-07-08 NOTE — TELEPHONE ENCOUNTER
Pt called back, stating that she would like to proceed and complete a HHR. Advised pt we will reach out further once I am advised by our providers. Pt understood with no further questions or concerns. Thanks.

## 2025-07-09 LAB
CYTO UR: NORMAL
LAB AP CASE REPORT: NORMAL
LAB AP CLINICAL INFORMATION: NORMAL
PATH REPORT.FINAL DX SPEC: NORMAL
PATH REPORT.GROSS SPEC: NORMAL

## 2025-07-09 NOTE — TELEPHONE ENCOUNTER
Pt returned call, advised that she will need an OV to discuss/schedule HHR. I advised that the  staff will contact her soon to get that scheduled. Encounter has been routed to  to contact pt.

## 2025-07-14 ENCOUNTER — OFFICE VISIT (OUTPATIENT)
Dept: BARIATRICS/WEIGHT MGMT | Facility: CLINIC | Age: 60
End: 2025-07-14
Payer: MEDICARE

## 2025-07-14 VITALS
RESPIRATION RATE: 18 BRPM | TEMPERATURE: 97.1 F | SYSTOLIC BLOOD PRESSURE: 122 MMHG | HEART RATE: 92 BPM | WEIGHT: 204.6 LBS | OXYGEN SATURATION: 98 % | BODY MASS INDEX: 32.88 KG/M2 | DIASTOLIC BLOOD PRESSURE: 82 MMHG | HEIGHT: 66 IN

## 2025-07-14 DIAGNOSIS — K44.9 HIATAL HERNIA WITH GERD: Primary | ICD-10-CM

## 2025-07-14 DIAGNOSIS — K21.9 HIATAL HERNIA WITH GERD: Primary | ICD-10-CM

## 2025-07-14 PROCEDURE — 3074F SYST BP LT 130 MM HG: CPT | Performed by: PHYSICIAN ASSISTANT

## 2025-07-14 PROCEDURE — 3079F DIAST BP 80-89 MM HG: CPT | Performed by: PHYSICIAN ASSISTANT

## 2025-07-14 PROCEDURE — 99214 OFFICE O/P EST MOD 30 MIN: CPT | Performed by: PHYSICIAN ASSISTANT

## 2025-07-14 PROCEDURE — 1159F MED LIST DOCD IN RCRD: CPT | Performed by: PHYSICIAN ASSISTANT

## 2025-07-14 PROCEDURE — 1160F RVW MEDS BY RX/DR IN RCRD: CPT | Performed by: PHYSICIAN ASSISTANT

## 2025-07-14 RX ORDER — OXYBUTYNIN CHLORIDE 10 MG/1
10 TABLET, EXTENDED RELEASE ORAL EVERY EVENING
COMMUNITY

## 2025-07-14 RX ORDER — SODIUM CHLORIDE 9 MG/ML
40 INJECTION, SOLUTION INTRAVENOUS AS NEEDED
OUTPATIENT
Start: 2025-07-14

## 2025-07-14 RX ORDER — ENOXAPARIN SODIUM 100 MG/ML
40 INJECTION SUBCUTANEOUS
OUTPATIENT
Start: 2025-07-15 | End: 2025-07-16

## 2025-07-14 RX ORDER — SODIUM CHLORIDE 9 MG/ML
150 INJECTION, SOLUTION INTRAVENOUS CONTINUOUS
OUTPATIENT
Start: 2025-07-14 | End: 2025-07-15

## 2025-07-14 RX ORDER — METHENAMINE HIPPURATE 1000 MG/1
1 TABLET ORAL 2 TIMES DAILY
COMMUNITY

## 2025-07-14 RX ORDER — SODIUM CHLORIDE 0.9 % (FLUSH) 0.9 %
3 SYRINGE (ML) INJECTION EVERY 12 HOURS SCHEDULED
OUTPATIENT
Start: 2025-07-14

## 2025-07-14 RX ORDER — SODIUM CHLORIDE 0.9 % (FLUSH) 0.9 %
3-10 SYRINGE (ML) INJECTION AS NEEDED
OUTPATIENT
Start: 2025-07-14

## 2025-07-14 RX ORDER — IBUPROFEN 800 MG/1
TABLET, FILM COATED ORAL
COMMUNITY

## 2025-07-14 NOTE — PROGRESS NOTES
[facility]  [address]  [address]  [facility] [id number] [phone]      Patient Name:  Mayuri Watts.  :  1965      Date of Visit: 2025        Reason for Visit:  dysphagia, reflux, hiatal hernia      HPI: Mayuri Watts is a 59 y.o. female s/p LSG 16 GDW     Reports worsening reflux and dysphagia for the last few years.  Has associated N/V/AP, despite taking daily Protonix.  Hx chronic constipation, bowels move q5-7 days.  Last CLN in [facility], reportedly unremarkable.  Remote hx lap stiven.  Denies GLP1s.  Quit smoking in , but still vapes nicotine.      Prior Eval includes:     EGD 3/10/23 w/ Dr. Truong - small sliding HH noted.  Gastric Bx benign.      Bravo pH study 3/10/23 @[facility] - [place] 23.9 c/w GERD.      UGI 25 @[facility] - IMPRESSION:  1. Cervical esophageal web causing some narrowing of upper esophagus.  Consider EGD for further evaluation.  2. Large hiatal hernia.  3. Significant gastroesophageal reflux to the thoracic inlet.  4. Significant dysmotility.    EGD 25 w/ Dr. Palencia - large sliding HH (from 33 to 39cm).  BX benign.      ----- Message from Garrett Palencia sent at 2025 11:32 AM EDT -----  Please schedule her for a laparoscopic hiatal hernia repair and EGD if she is interested.       Normally I would discuss options but as I discussed with her sister who is with her today she is not really a candidate for gastric bypass with her ongoing nicotine use          ------   Presurgery weight: 345 lbs. Today's weight is 92.8 kg (204 lb 9.6 oz) pounds, today's BMI is Body mass index is 33.53 kg/m². and her weight loss since surgery is 141 pounds.       Past Medical History:   Diagnosis Date    Abnormal PFTs     Anxiety     Asthma     Cervical disc disease     PT STATES BULGING DISKS IN NECK, HAS BEEN DOING PHYSICAL THERAPY AND STEROID SHOTS (PAST) FOR THIS    Chronic back pain     HX OF BACK SURGERY    Chronic narcotic use      Depression     Dysphagia     rare cervical dysphagia for steak    Elevated LFTs     Fibromyalgia     Foot pain     LEFT FOOT PAIN, DUE TO TORN TENDON    Frequent UTI     Urology eval (P)    GERD (gastroesophageal reflux disease)     History of Papanicolaou smear of cervix 2015    NORMAL     HTN (hypertension)     Hypercholesteremia     Hypothyroidism     Joint pain     Miscarriage     Mixed stress and urge urinary incontinence     Morbid obesity      (normal spontaneous vaginal delivery)     x1 w/o complic    Osteoarthritis     Osteoporosis     Pelvic prolapse     Polycythemia     Psoriasis     Psoriatic arthritis     Sleep apnea     CPAP compliant - SET AT 16, RESPIRATORY NOTIFIED.    Stress incontinence     Urinary incontinence     Varicella     Vitamin D deficiency     Wears glasses      Past Surgical History:   Procedure Laterality Date    CARDIAC CATHETERIZATION      NO INTERVENTION    COLONOSCOPY      PT STATES POLYPS REMOVED    ENDOSCOPY N/A 03/10/2023    Procedure: ESOPHAGOGASTRODUODENOSCOPY WITH SAUCEDA;  Surgeon: Sebastian Truong MD;  Location:  [facility];  Service: Gastroenterology;  Laterality: N/A;    GASTRIC SLEEVE LAPAROSCOPIC N/A 2016    Procedure: GASTRIC SLEEVE LAPAROSCOPIC;  Surgeon: Garrett Palencia MD;  Location:  [facility];  Service:     KNEE ARTHROSCOPY Left 2008    LAPAROSCOPIC CHOLECYSTECTOMY      no stones. [facility].    LAPAROSCOPIC TUBAL LIGATION      LUMBAR FUSION      L4-5 disc and hardware [facility]     Outpatient Medications Marked as Taking for the 25 encounter (Office Visit) with Yahaira Isidro PA   Medication Sig Dispense Refill    albuterol sulfate HFA (Ventolin HFA) 108 (90 Base) MCG/ACT inhaler Inhale 2 puffs Every 4 (Four) Hours As Needed for Wheezing or Shortness of Air. 18 g 5    amitriptyline (ELAVIL) 50 MG tablet Take 1 tablet by mouth Every Night.      Elmiron 100 MG capsule Take 1 capsule by mouth 3 (Three) Times a  Day Before Meals.      estradiol (ESTRACE) 0.1 MG/GM vaginal cream Use 1/4 applicator twice weekly at bedtime. (Patient taking differently: Insert 2 g into the vagina Every 7 (Seven) Days.) 42.5 g 6    gabapentin (NEURONTIN) 600 MG tablet Take 1 tablet by mouth 3 (Three) Times a Day.  0    HYDROcodone-acetaminophen (NORCO) 5-325 MG per tablet Take 1 tablet by mouth Every 6 (Six) Hours As Needed for moderate pain (4-6). 30 tablet 0    hydrOXYzine (ATARAX) 50 MG tablet Take 1 tablet by mouth Daily.      ipratropium-albuterol (DUO-NEB) 0.5-2.5 mg/3 ml nebulizer ipratropium 0.5 mg-albuterol 3 mg (2.5 mg base)/3 mL nebulization soln   USE 1 VIAL VIA NEBULIZER EVERY 6 HOURS AS NEEDED FOR WHEEZING OR SHORTNESS OF BREATH      ketoconazole (NIZORAL) 2 % cream Apply 1 Application topically to the appropriate area as directed As Needed.      levothyroxine (SYNTHROID, LEVOTHROID) 100 MCG tablet Take 1 tablet by mouth Every Morning.      lisinopril-hydrochlorothiazide (PRINZIDE,ZESTORETIC) 10-12.5 MG per tablet   0    Multiple Vitamins-Minerals (MULTIVITAMIN ADULT PO) Take 1 tablet by mouth Daily.      NON FORMULARY OTC amberian for menopause      pantoprazole (PROTONIX) 40 MG EC tablet Take 1 tablet by mouth Daily.  0    Skyrizi 150 MG/ML solution prefilled syringe       solifenacin (VESICARE) 5 MG tablet Take 1 tablet by mouth Daily.      tiotropium bromide monohydrate (Spiriva Respimat) 2.5 MCG/ACT aerosol solution inhaler Inhale 2 puffs Daily. 12 g 2    triamcinolone (KENALOG) 0.1 % cream Apply 1 Application topically to the appropriate area as directed As Needed.      venlafaxine XR (EFFEXOR-XR) 150 MG 24 hr capsule Take 1 capsule by mouth Daily.      vitamin B-12 (CYANOCOBALAMIN) 1000 MCG tablet Take 1 tablet by mouth Daily. OTC       Allergies   Allergen Reactions    Wellbutrin [Bupropion] Rash     Social History     Socioeconomic History    Marital status:    Tobacco Use    Smoking status: Former     Current  "packs/day: 0.00     Average packs/day: 1.5 packs/day for 32.0 years (48.0 ttl pk-yrs)     Types: Cigarettes, Electronic Cigarette     Start date: 2/15/1991     Quit date: 2/15/2023     Years since quittin.4    Smokeless tobacco: Never    Tobacco comments:     PT STATES SHE USES THE E-CIGARETTE OCCASIONALLY   Vaping Use    Vaping status: Every Day    Substances: Nicotine, Flavoring   Substance and Sexual Activity    Alcohol use: No    Drug use: No    Sexual activity: Not Currently     Partners: Male       /82   Pulse 92   Temp 97.1 °F (36.2 °C) (Tympanic)   Resp 18   Ht 166.4 cm (65.5\")   Wt 92.8 kg (204 lb 9.6 oz)   SpO2 98%   BMI 33.53 kg/m²     Physical Exam  Constitutional:       Appearance: She is well-developed.   Eyes:      General: No scleral icterus.  Cardiovascular:      Rate and Rhythm: Normal rate and regular rhythm.   Pulmonary:      Effort: Pulmonary effort is normal.      Breath sounds: Normal breath sounds.   Musculoskeletal:         General: Normal range of motion.   Skin:     General: Skin is warm and dry.      Findings: No rash.   Neurological:      Mental Status: She is alert.   Psychiatric:         Behavior: Behavior normal. Behavior is cooperative.         Judgment: Judgment normal.           Assessment:  8+ years s/p LSG 16 GDW       ICD-10-CM ICD-9-CM   1. Hiatal hernia with GERD  K44.9 553.3    K21.9 530.81         Plan:  Will schedule Laparoscopic Hiatal Hernia Repair + EGD @[facility] w/ Dr. Palencia.  Possible risk of dysphagia, infection, bleeding, injury to surrounding structures, lack of symptom improvement, as well as potential recurrent hernia discussed w/ patient - willing to proceed.  Avoid NSAIDS x 1 wk prior.  Postoperative diet reviewed.  Call w/ questions/concerns.      "

## 2025-07-14 NOTE — H&P (VIEW-ONLY)
[facility]  [address]  [address]  [facility] [id number] [phone]      Patient Name:  Mayuri Watts.  :  1965      Date of Visit: 2025        Reason for Visit:  dysphagia, reflux, hiatal hernia      HPI: Mayuri Watts is a 59 y.o. female s/p LSG 16 GDW     Reports worsening reflux and dysphagia for the last few years.  Has associated N/V/AP, despite taking daily Protonix.  Hx chronic constipation, bowels move q5-7 days.  Last CLN in [facility], reportedly unremarkable.  Remote hx lap stiven.  Denies GLP1s.  Quit smoking in , but still vapes nicotine.      Prior Eval includes:     EGD 3/10/23 w/ Dr. Truong - small sliding HH noted.  Gastric Bx benign.      Bravo pH study 3/10/23 @[facility] [place] 23.9 c/w GERD.      UGI 25 @[facility] - IMPRESSION:  1. Cervical esophageal web causing some narrowing of upper esophagus.  Consider EGD for further evaluation.  2. Large hiatal hernia.  3. Significant gastroesophageal reflux to the thoracic inlet.  4. Significant dysmotility.    EGD 25 w/ Dr. Palencia - large sliding HH (from 33 to 39cm).  BX benign.      ----- Message from Garrett Palencia sent at 2025 11:32 AM EDT -----  Please schedule her for a laparoscopic hiatal hernia repair and EGD if she is interested.       Normally I would discuss options but as I discussed with her sister who is with her today she is not really a candidate for gastric bypass with her ongoing nicotine use          ------   Presurgery weight: 345 lbs. Today's weight is 92.8 kg (204 lb 9.6 oz) pounds, today's BMI is Body mass index is 33.53 kg/m². and her weight loss since surgery is 141 pounds.       Past Medical History:   Diagnosis Date    Abnormal PFTs     Anxiety     Asthma     Cervical disc disease     PT STATES BULGING DISKS IN NECK, HAS BEEN DOING PHYSICAL THERAPY AND STEROID SHOTS (PAST) FOR THIS    Chronic back pain     HX OF BACK SURGERY    Chronic narcotic use      Depression     Dysphagia     rare cervical dysphagia for steak    Elevated LFTs     Fibromyalgia     Foot pain     LEFT FOOT PAIN, DUE TO TORN TENDON    Frequent UTI     Urology eval (P)    GERD (gastroesophageal reflux disease)     History of Papanicolaou smear of cervix 2015    NORMAL     HTN (hypertension)     Hypercholesteremia     Hypothyroidism     Joint pain     Miscarriage     Mixed stress and urge urinary incontinence     Morbid obesity      (normal spontaneous vaginal delivery)     x1 w/o complic    Osteoarthritis     Osteoporosis     Pelvic prolapse     Polycythemia     Psoriasis     Psoriatic arthritis     Sleep apnea     CPAP compliant - SET AT 16, RESPIRATORY NOTIFIED.    Stress incontinence     Urinary incontinence     Varicella     Vitamin D deficiency     Wears glasses      Past Surgical History:   Procedure Laterality Date    CARDIAC CATHETERIZATION      NO INTERVENTION    COLONOSCOPY      PT STATES POLYPS REMOVED    ENDOSCOPY N/A 03/10/2023    Procedure: ESOPHAGOGASTRODUODENOSCOPY WITH SAUCEDA;  Surgeon: Sebastian Truong MD;  Location:  [facility];  Service: Gastroenterology;  Laterality: N/A;    GASTRIC SLEEVE LAPAROSCOPIC N/A 2016    Procedure: GASTRIC SLEEVE LAPAROSCOPIC;  Surgeon: Garrett Palencia MD;  Location:  [facility];  Service:     KNEE ARTHROSCOPY Left 2008    LAPAROSCOPIC CHOLECYSTECTOMY      no stones. [facility].    LAPAROSCOPIC TUBAL LIGATION      LUMBAR FUSION      L4-5 disc and hardware [facility]     Outpatient Medications Marked as Taking for the 25 encounter (Office Visit) with Yahaira Isidro PA   Medication Sig Dispense Refill    albuterol sulfate HFA (Ventolin HFA) 108 (90 Base) MCG/ACT inhaler Inhale 2 puffs Every 4 (Four) Hours As Needed for Wheezing or Shortness of Air. 18 g 5    amitriptyline (ELAVIL) 50 MG tablet Take 1 tablet by mouth Every Night.      Elmiron 100 MG capsule Take 1 capsule by mouth 3 (Three) Times a  Day Before Meals.      estradiol (ESTRACE) 0.1 MG/GM vaginal cream Use 1/4 applicator twice weekly at bedtime. (Patient taking differently: Insert 2 g into the vagina Every 7 (Seven) Days.) 42.5 g 6    gabapentin (NEURONTIN) 600 MG tablet Take 1 tablet by mouth 3 (Three) Times a Day.  0    HYDROcodone-acetaminophen (NORCO) 5-325 MG per tablet Take 1 tablet by mouth Every 6 (Six) Hours As Needed for moderate pain (4-6). 30 tablet 0    hydrOXYzine (ATARAX) 50 MG tablet Take 1 tablet by mouth Daily.      ipratropium-albuterol (DUO-NEB) 0.5-2.5 mg/3 ml nebulizer ipratropium 0.5 mg-albuterol 3 mg (2.5 mg base)/3 mL nebulization soln   USE 1 VIAL VIA NEBULIZER EVERY 6 HOURS AS NEEDED FOR WHEEZING OR SHORTNESS OF BREATH      ketoconazole (NIZORAL) 2 % cream Apply 1 Application topically to the appropriate area as directed As Needed.      levothyroxine (SYNTHROID, LEVOTHROID) 100 MCG tablet Take 1 tablet by mouth Every Morning.      lisinopril-hydrochlorothiazide (PRINZIDE,ZESTORETIC) 10-12.5 MG per tablet   0    Multiple Vitamins-Minerals (MULTIVITAMIN ADULT PO) Take 1 tablet by mouth Daily.      NON FORMULARY OTC amberian for menopause      pantoprazole (PROTONIX) 40 MG EC tablet Take 1 tablet by mouth Daily.  0    Skyrizi 150 MG/ML solution prefilled syringe       solifenacin (VESICARE) 5 MG tablet Take 1 tablet by mouth Daily.      tiotropium bromide monohydrate (Spiriva Respimat) 2.5 MCG/ACT aerosol solution inhaler Inhale 2 puffs Daily. 12 g 2    triamcinolone (KENALOG) 0.1 % cream Apply 1 Application topically to the appropriate area as directed As Needed.      venlafaxine XR (EFFEXOR-XR) 150 MG 24 hr capsule Take 1 capsule by mouth Daily.      vitamin B-12 (CYANOCOBALAMIN) 1000 MCG tablet Take 1 tablet by mouth Daily. OTC       Allergies   Allergen Reactions    Wellbutrin [Bupropion] Rash     Social History     Socioeconomic History    Marital status:    Tobacco Use    Smoking status: Former     Current  "packs/day: 0.00     Average packs/day: 1.5 packs/day for 32.0 years (48.0 ttl pk-yrs)     Types: Cigarettes, Electronic Cigarette     Start date: 2/15/1991     Quit date: 2/15/2023     Years since quittin.4    Smokeless tobacco: Never    Tobacco comments:     PT STATES SHE USES THE E-CIGARETTE OCCASIONALLY   Vaping Use    Vaping status: Every Day    Substances: Nicotine, Flavoring   Substance and Sexual Activity    Alcohol use: No    Drug use: No    Sexual activity: Not Currently     Partners: Male       /82   Pulse 92   Temp 97.1 °F (36.2 °C) (Tympanic)   Resp 18   Ht 166.4 cm (65.5\")   Wt 92.8 kg (204 lb 9.6 oz)   SpO2 98%   BMI 33.53 kg/m²     Physical Exam  Constitutional:       Appearance: She is well-developed.   Eyes:      General: No scleral icterus.  Cardiovascular:      Rate and Rhythm: Normal rate and regular rhythm.   Pulmonary:      Effort: Pulmonary effort is normal.      Breath sounds: Normal breath sounds.   Musculoskeletal:         General: Normal range of motion.   Skin:     General: Skin is warm and dry.      Findings: No rash.   Neurological:      Mental Status: She is alert.   Psychiatric:         Behavior: Behavior normal. Behavior is cooperative.         Judgment: Judgment normal.           Assessment:  8+ years s/p LSG 16 GDW       ICD-10-CM ICD-9-CM   1. Hiatal hernia with GERD  K44.9 553.3    K21.9 530.81         Plan:  Will schedule Laparoscopic Hiatal Hernia Repair + EGD @[facility] w/ Dr. Palencia.  Possible risk of dysphagia, infection, bleeding, injury to surrounding structures, lack of symptom improvement, as well as potential recurrent hernia discussed w/ patient - willing to proceed.  Avoid NSAIDS x 1 wk prior.  Postoperative diet reviewed.  Call w/ questions/concerns.      "

## 2025-07-24 ENCOUNTER — PRE-ADMISSION TESTING (OUTPATIENT)
Dept: PREADMISSION TESTING | Facility: HOSPITAL | Age: 60
End: 2025-07-24
Payer: MEDICARE

## 2025-07-24 DIAGNOSIS — K44.9 HIATAL HERNIA WITH GERD: ICD-10-CM

## 2025-07-24 DIAGNOSIS — K21.9 HIATAL HERNIA WITH GERD: ICD-10-CM

## 2025-07-24 LAB
DEPRECATED RDW RBC AUTO: 46.3 FL (ref 37–54)
ERYTHROCYTE [DISTWIDTH] IN BLOOD BY AUTOMATED COUNT: 14.4 % (ref 12.3–15.4)
HCT VFR BLD AUTO: 40.8 % (ref 34–46.6)
HGB BLD-MCNC: 13.7 G/DL (ref 12–15.9)
MCH RBC QN AUTO: 29.3 PG (ref 26.6–33)
MCHC RBC AUTO-ENTMCNC: 33.6 G/DL (ref 31.5–35.7)
MCV RBC AUTO: 87.2 FL (ref 79–97)
PLATELET # BLD AUTO: 192 10*3/MM3 (ref 140–450)
PMV BLD AUTO: 11.2 FL (ref 6–12)
POTASSIUM SERPL-SCNC: 3.6 MMOL/L (ref 3.5–5.2)
RBC # BLD AUTO: 4.68 10*6/MM3 (ref 3.77–5.28)
WBC NRBC COR # BLD AUTO: 6.09 10*3/MM3 (ref 3.4–10.8)

## 2025-07-24 PROCEDURE — 84132 ASSAY OF SERUM POTASSIUM: CPT

## 2025-07-24 PROCEDURE — 93010 ELECTROCARDIOGRAM REPORT: CPT | Performed by: INTERNAL MEDICINE

## 2025-07-24 PROCEDURE — 85027 COMPLETE CBC AUTOMATED: CPT

## 2025-07-24 PROCEDURE — 36415 COLL VENOUS BLD VENIPUNCTURE: CPT

## 2025-07-24 PROCEDURE — 93005 ELECTROCARDIOGRAM TRACING: CPT

## 2025-07-24 PROCEDURE — 87081 CULTURE SCREEN ONLY: CPT

## 2025-07-24 RX ORDER — VIBEGRON 75 MG/1
1 TABLET, FILM COATED ORAL DAILY
COMMUNITY

## 2025-07-25 LAB
MRSA SPEC QL CULT: NORMAL
QT INTERVAL: 384 MS
QTC INTERVAL: 428 MS

## 2025-07-30 ENCOUNTER — ANESTHESIA EVENT CONVERTED (OUTPATIENT)
Dept: ANESTHESIOLOGY | Facility: HOSPITAL | Age: 60
End: 2025-07-30
Payer: MEDICARE

## 2025-07-30 ENCOUNTER — HOSPITAL ENCOUNTER (OUTPATIENT)
Facility: HOSPITAL | Age: 60
Setting detail: HOSPITAL OUTPATIENT SURGERY
Discharge: HOME | End: 2025-07-30
Attending: SURGERY | Admitting: SURGERY
Payer: MEDICARE

## 2025-07-30 ENCOUNTER — ANESTHESIA (OUTPATIENT)
Dept: PERIOP | Facility: HOSPITAL | Age: 60
End: 2025-07-30
Payer: MEDICARE

## 2025-07-30 ENCOUNTER — ANESTHESIA EVENT (OUTPATIENT)
Dept: PERIOP | Facility: HOSPITAL | Age: 60
End: 2025-07-30
Payer: MEDICARE

## 2025-07-30 VITALS
OXYGEN SATURATION: 92 % | TEMPERATURE: 97.9 F | RESPIRATION RATE: 18 BRPM | SYSTOLIC BLOOD PRESSURE: 126 MMHG | BODY MASS INDEX: 32.88 KG/M2 | HEIGHT: 66 IN | WEIGHT: 204.6 LBS | DIASTOLIC BLOOD PRESSURE: 71 MMHG | HEART RATE: 69 BPM

## 2025-07-30 DIAGNOSIS — K21.9 HIATAL HERNIA WITH GERD: ICD-10-CM

## 2025-07-30 DIAGNOSIS — K44.9 HIATAL HERNIA WITH GERD: ICD-10-CM

## 2025-07-30 PROCEDURE — 25010000002 LIDOCAINE PF 1% 1 % SOLUTION

## 2025-07-30 PROCEDURE — 25010000002 FENTANYL CITRATE (PF) 100 MCG/2ML SOLUTION

## 2025-07-30 PROCEDURE — 25010000002 DEXAMETHASONE PER 1 MG

## 2025-07-30 PROCEDURE — 25010000002 SUGAMMADEX 200 MG/2ML SOLUTION

## 2025-07-30 PROCEDURE — 43280 LAPAROSCOPY FUNDOPLASTY: CPT | Performed by: SURGERY

## 2025-07-30 PROCEDURE — 25010000002 CEFAZOLIN PER 500 MG: Performed by: PHYSICIAN ASSISTANT

## 2025-07-30 PROCEDURE — 25010000002 HYDROMORPHONE 1 MG/ML SOLUTION

## 2025-07-30 PROCEDURE — 25010000002 PROPOFOL 10 MG/ML EMULSION

## 2025-07-30 PROCEDURE — 25810000003 SODIUM CHLORIDE 0.9 % SOLUTION: Performed by: PHYSICIAN ASSISTANT

## 2025-07-30 PROCEDURE — 25810000003 LACTATED RINGERS PER 1000 ML: Performed by: ANESTHESIOLOGY

## 2025-07-30 PROCEDURE — 25010000002 BUPIVACAINE (PF) 0.25 % SOLUTION

## 2025-07-30 PROCEDURE — 25010000002 ONDANSETRON PER 1 MG

## 2025-07-30 PROCEDURE — 25010000002 DROPERIDOL PER 5 MG

## 2025-07-30 PROCEDURE — 25010000002 LIDOCAINE PF 1% 1 % SOLUTION: Performed by: ANESTHESIOLOGY

## 2025-07-30 DEVICE — KNOTLESS TISSUE CONTROL DEVICE, VIOLET UNIDIRECTIONAL (ANTIBACTERIAL) SYNTHETIC ABSORBABLE DEVICE
Type: IMPLANTABLE DEVICE | Site: ABDOMEN | Status: FUNCTIONAL
Brand: STRATAFIX

## 2025-07-30 DEVICE — PBT NON ABSORBABLE WOUND CLOSURE DEVICE
Type: IMPLANTABLE DEVICE | Site: ABDOMEN | Status: FUNCTIONAL
Brand: V-LOC

## 2025-07-30 RX ORDER — SODIUM CHLORIDE 9 MG/ML
9 INJECTION, SOLUTION INTRAVENOUS AS NEEDED
Status: DISCONTINUED | OUTPATIENT
Start: 2025-07-30 | End: 2025-07-30 | Stop reason: HOSPADM

## 2025-07-30 RX ORDER — ONDANSETRON 2 MG/ML
INJECTION INTRAMUSCULAR; INTRAVENOUS AS NEEDED
Status: DISCONTINUED | OUTPATIENT
Start: 2025-07-30 | End: 2025-07-30 | Stop reason: SURG

## 2025-07-30 RX ORDER — FENTANYL CITRATE 50 UG/ML
INJECTION, SOLUTION INTRAMUSCULAR; INTRAVENOUS AS NEEDED
Status: DISCONTINUED | OUTPATIENT
Start: 2025-07-30 | End: 2025-07-30 | Stop reason: SURG

## 2025-07-30 RX ORDER — LIDOCAINE HYDROCHLORIDE 10 MG/ML
INJECTION, SOLUTION EPIDURAL; INFILTRATION; INTRACAUDAL; PERINEURAL AS NEEDED
Status: DISCONTINUED | OUTPATIENT
Start: 2025-07-30 | End: 2025-07-30 | Stop reason: SURG

## 2025-07-30 RX ORDER — PROPOFOL 10 MG/ML
VIAL (ML) INTRAVENOUS AS NEEDED
Status: DISCONTINUED | OUTPATIENT
Start: 2025-07-30 | End: 2025-07-30 | Stop reason: SURG

## 2025-07-30 RX ORDER — NALOXONE HCL 0.4 MG/ML
0.4 VIAL (ML) INJECTION AS NEEDED
Status: DISCONTINUED | OUTPATIENT
Start: 2025-07-30 | End: 2025-07-30 | Stop reason: HOSPADM

## 2025-07-30 RX ORDER — SODIUM CHLORIDE 9 MG/ML
40 INJECTION, SOLUTION INTRAVENOUS AS NEEDED
Status: DISCONTINUED | OUTPATIENT
Start: 2025-07-30 | End: 2025-07-30 | Stop reason: HOSPADM

## 2025-07-30 RX ORDER — SODIUM CHLORIDE 0.9 % (FLUSH) 0.9 %
3 SYRINGE (ML) INJECTION EVERY 12 HOURS SCHEDULED
Status: DISCONTINUED | OUTPATIENT
Start: 2025-07-30 | End: 2025-07-30 | Stop reason: HOSPADM

## 2025-07-30 RX ORDER — HYDROCODONE BITARTRATE AND ACETAMINOPHEN 7.5; 325 MG/1; MG/1
1 TABLET ORAL EVERY 4 HOURS PRN
Status: DISCONTINUED | OUTPATIENT
Start: 2025-07-30 | End: 2025-07-30 | Stop reason: HOSPADM

## 2025-07-30 RX ORDER — DROPERIDOL 2.5 MG/ML
0.62 INJECTION, SOLUTION INTRAMUSCULAR; INTRAVENOUS
Status: DISCONTINUED | OUTPATIENT
Start: 2025-07-30 | End: 2025-07-30 | Stop reason: HOSPADM

## 2025-07-30 RX ORDER — FAMOTIDINE 20 MG/1
20 TABLET, FILM COATED ORAL ONCE
Status: COMPLETED | OUTPATIENT
Start: 2025-07-30 | End: 2025-07-30

## 2025-07-30 RX ORDER — SODIUM CHLORIDE 0.9 % (FLUSH) 0.9 %
10 SYRINGE (ML) INJECTION EVERY 12 HOURS SCHEDULED
Status: DISCONTINUED | OUTPATIENT
Start: 2025-07-30 | End: 2025-07-30 | Stop reason: HOSPADM

## 2025-07-30 RX ORDER — LABETALOL HYDROCHLORIDE 5 MG/ML
5 INJECTION, SOLUTION INTRAVENOUS
Status: DISCONTINUED | OUTPATIENT
Start: 2025-07-30 | End: 2025-07-30 | Stop reason: HOSPADM

## 2025-07-30 RX ORDER — FAMOTIDINE 10 MG/ML
20 INJECTION, SOLUTION INTRAVENOUS ONCE
Status: DISCONTINUED | OUTPATIENT
Start: 2025-07-30 | End: 2025-07-30 | Stop reason: HOSPADM

## 2025-07-30 RX ORDER — IPRATROPIUM BROMIDE AND ALBUTEROL SULFATE 2.5; .5 MG/3ML; MG/3ML
3 SOLUTION RESPIRATORY (INHALATION) ONCE AS NEEDED
Status: DISCONTINUED | OUTPATIENT
Start: 2025-07-30 | End: 2025-07-30 | Stop reason: HOSPADM

## 2025-07-30 RX ORDER — OXYCODONE HYDROCHLORIDE 5 MG/1
5 TABLET ORAL EVERY 4 HOURS PRN
Qty: 10 TABLET | Refills: 0 | Status: SHIPPED | OUTPATIENT
Start: 2025-07-30

## 2025-07-30 RX ORDER — HYDROCODONE BITARTRATE AND ACETAMINOPHEN 5; 325 MG/1; MG/1
1 TABLET ORAL ONCE AS NEEDED
Status: DISCONTINUED | OUTPATIENT
Start: 2025-07-30 | End: 2025-07-30 | Stop reason: HOSPADM

## 2025-07-30 RX ORDER — ONDANSETRON 4 MG/1
4 TABLET, ORALLY DISINTEGRATING ORAL EVERY 4 HOURS PRN
Qty: 10 TABLET | Refills: 0 | Status: SHIPPED | OUTPATIENT
Start: 2025-07-30

## 2025-07-30 RX ORDER — HYDRALAZINE HYDROCHLORIDE 20 MG/ML
5 INJECTION INTRAMUSCULAR; INTRAVENOUS
Status: DISCONTINUED | OUTPATIENT
Start: 2025-07-30 | End: 2025-07-30 | Stop reason: HOSPADM

## 2025-07-30 RX ORDER — SODIUM CHLORIDE 0.9 % (FLUSH) 0.9 %
3-10 SYRINGE (ML) INJECTION AS NEEDED
Status: DISCONTINUED | OUTPATIENT
Start: 2025-07-30 | End: 2025-07-30 | Stop reason: HOSPADM

## 2025-07-30 RX ORDER — MAGNESIUM HYDROXIDE 1200 MG/15ML
LIQUID ORAL AS NEEDED
Status: DISCONTINUED | OUTPATIENT
Start: 2025-07-30 | End: 2025-07-30 | Stop reason: HOSPADM

## 2025-07-30 RX ORDER — SODIUM CHLORIDE, SODIUM LACTATE, POTASSIUM CHLORIDE, CALCIUM CHLORIDE 600; 310; 30; 20 MG/100ML; MG/100ML; MG/100ML; MG/100ML
9 INJECTION, SOLUTION INTRAVENOUS CONTINUOUS
Status: DISCONTINUED | OUTPATIENT
Start: 2025-07-31 | End: 2025-07-30 | Stop reason: HOSPADM

## 2025-07-30 RX ORDER — DROPERIDOL 2.5 MG/ML
INJECTION, SOLUTION INTRAMUSCULAR; INTRAVENOUS
Status: COMPLETED
Start: 2025-07-30 | End: 2025-07-30

## 2025-07-30 RX ORDER — PROMETHAZINE HYDROCHLORIDE 25 MG/1
25 SUPPOSITORY RECTAL ONCE AS NEEDED
Status: DISCONTINUED | OUTPATIENT
Start: 2025-07-30 | End: 2025-07-30 | Stop reason: HOSPADM

## 2025-07-30 RX ORDER — BUPIVACAINE HYDROCHLORIDE 2.5 MG/ML
INJECTION, SOLUTION EPIDURAL; INFILTRATION; INTRACAUDAL; PERINEURAL
Status: COMPLETED | OUTPATIENT
Start: 2025-07-30 | End: 2025-07-30

## 2025-07-30 RX ORDER — DROPERIDOL 2.5 MG/ML
0.62 INJECTION, SOLUTION INTRAMUSCULAR; INTRAVENOUS ONCE AS NEEDED
Status: DISCONTINUED | OUTPATIENT
Start: 2025-07-30 | End: 2025-07-30 | Stop reason: HOSPADM

## 2025-07-30 RX ORDER — HYDROMORPHONE HYDROCHLORIDE 1 MG/ML
0.5 INJECTION, SOLUTION INTRAMUSCULAR; INTRAVENOUS; SUBCUTANEOUS
Status: DISCONTINUED | OUTPATIENT
Start: 2025-07-30 | End: 2025-07-30 | Stop reason: HOSPADM

## 2025-07-30 RX ORDER — SODIUM CHLORIDE 9 MG/ML
150 INJECTION, SOLUTION INTRAVENOUS CONTINUOUS
Status: DISCONTINUED | OUTPATIENT
Start: 2025-07-30 | End: 2025-07-30 | Stop reason: HOSPADM

## 2025-07-30 RX ORDER — ROCURONIUM BROMIDE 10 MG/ML
INJECTION, SOLUTION INTRAVENOUS AS NEEDED
Status: DISCONTINUED | OUTPATIENT
Start: 2025-07-30 | End: 2025-07-30 | Stop reason: SURG

## 2025-07-30 RX ORDER — ONDANSETRON 2 MG/ML
4 INJECTION INTRAMUSCULAR; INTRAVENOUS ONCE AS NEEDED
Status: DISCONTINUED | OUTPATIENT
Start: 2025-07-30 | End: 2025-07-30 | Stop reason: HOSPADM

## 2025-07-30 RX ORDER — PROMETHAZINE HYDROCHLORIDE 25 MG/1
25 TABLET ORAL ONCE AS NEEDED
Status: DISCONTINUED | OUTPATIENT
Start: 2025-07-30 | End: 2025-07-30 | Stop reason: HOSPADM

## 2025-07-30 RX ORDER — SODIUM CHLORIDE, SODIUM LACTATE, POTASSIUM CHLORIDE, CALCIUM CHLORIDE 600; 310; 30; 20 MG/100ML; MG/100ML; MG/100ML; MG/100ML
9 INJECTION, SOLUTION INTRAVENOUS CONTINUOUS
Status: DISCONTINUED | OUTPATIENT
Start: 2025-07-30 | End: 2025-07-30 | Stop reason: HOSPADM

## 2025-07-30 RX ORDER — SODIUM CHLORIDE 0.9 % (FLUSH) 0.9 %
10 SYRINGE (ML) INJECTION AS NEEDED
Status: DISCONTINUED | OUTPATIENT
Start: 2025-07-30 | End: 2025-07-30 | Stop reason: HOSPADM

## 2025-07-30 RX ORDER — DEXAMETHASONE SODIUM PHOSPHATE 4 MG/ML
INJECTION, SOLUTION INTRA-ARTICULAR; INTRALESIONAL; INTRAMUSCULAR; INTRAVENOUS; SOFT TISSUE AS NEEDED
Status: DISCONTINUED | OUTPATIENT
Start: 2025-07-30 | End: 2025-07-30 | Stop reason: SURG

## 2025-07-30 RX ORDER — ENOXAPARIN SODIUM 100 MG/ML
40 INJECTION SUBCUTANEOUS
Status: DISCONTINUED | OUTPATIENT
Start: 2025-07-30 | End: 2025-07-30 | Stop reason: HOSPADM

## 2025-07-30 RX ORDER — FENTANYL CITRATE 50 UG/ML
50 INJECTION, SOLUTION INTRAMUSCULAR; INTRAVENOUS
Status: DISCONTINUED | OUTPATIENT
Start: 2025-07-30 | End: 2025-07-30 | Stop reason: HOSPADM

## 2025-07-30 RX ORDER — MIDAZOLAM HYDROCHLORIDE 1 MG/ML
1 INJECTION, SOLUTION INTRAMUSCULAR; INTRAVENOUS
Status: DISCONTINUED | OUTPATIENT
Start: 2025-07-30 | End: 2025-07-30 | Stop reason: HOSPADM

## 2025-07-30 RX ORDER — LIDOCAINE HYDROCHLORIDE 10 MG/ML
0.5 INJECTION, SOLUTION EPIDURAL; INFILTRATION; INTRACAUDAL; PERINEURAL ONCE AS NEEDED
Status: COMPLETED | OUTPATIENT
Start: 2025-07-30 | End: 2025-07-30

## 2025-07-30 RX ADMIN — ONDANSETRON 4 MG: 2 INJECTION INTRAMUSCULAR; INTRAVENOUS at 07:47

## 2025-07-30 RX ADMIN — FENTANYL CITRATE 100 MCG: 50 INJECTION, SOLUTION INTRAMUSCULAR; INTRAVENOUS at 08:03

## 2025-07-30 RX ADMIN — LIDOCAINE HYDROCHLORIDE 50 MG: 10 INJECTION, SOLUTION EPIDURAL; INFILTRATION; INTRACAUDAL; PERINEURAL at 07:37

## 2025-07-30 RX ADMIN — SODIUM CHLORIDE, POTASSIUM CHLORIDE, SODIUM LACTATE AND CALCIUM CHLORIDE: 600; 310; 30; 20 INJECTION, SOLUTION INTRAVENOUS at 07:33

## 2025-07-30 RX ADMIN — ROCURONIUM BROMIDE 50 MG: 10 INJECTION INTRAVENOUS at 07:37

## 2025-07-30 RX ADMIN — BUPIVACAINE HYDROCHLORIDE 60 ML: 2.5 INJECTION, SOLUTION EPIDURAL; INFILTRATION; INTRACAUDAL; PERINEURAL at 07:43

## 2025-07-30 RX ADMIN — LIDOCAINE HYDROCHLORIDE 0.5 ML: 10 INJECTION, SOLUTION EPIDURAL; INFILTRATION; INTRACAUDAL; PERINEURAL at 07:13

## 2025-07-30 RX ADMIN — DROPERIDOL 0.62 MG: 2.5 INJECTION, SOLUTION INTRAMUSCULAR; INTRAVENOUS at 09:18

## 2025-07-30 RX ADMIN — HYDROMORPHONE HYDROCHLORIDE 0.5 MG: 1 INJECTION, SOLUTION INTRAMUSCULAR; INTRAVENOUS; SUBCUTANEOUS at 09:24

## 2025-07-30 RX ADMIN — PROPOFOL 200 MG: 10 INJECTION, EMULSION INTRAVENOUS at 07:37

## 2025-07-30 RX ADMIN — SODIUM CHLORIDE 500 ML: 9 INJECTION, SOLUTION INTRAVENOUS at 07:00

## 2025-07-30 RX ADMIN — SUGAMMADEX 200 MG: 100 INJECTION, SOLUTION INTRAVENOUS at 08:56

## 2025-07-30 RX ADMIN — FAMOTIDINE 20 MG: 20 TABLET, FILM COATED ORAL at 07:13

## 2025-07-30 RX ADMIN — SODIUM CHLORIDE 150 ML/HR: 9 INJECTION, SOLUTION INTRAVENOUS at 07:15

## 2025-07-30 RX ADMIN — SODIUM CHLORIDE 2000 MG: 900 INJECTION INTRAVENOUS at 07:43

## 2025-07-30 RX ADMIN — DEXAMETHASONE SODIUM PHOSPHATE 8 MG: 4 INJECTION, SOLUTION INTRAMUSCULAR; INTRAVENOUS at 07:47

## 2025-07-30 NOTE — ANESTHESIA PROCEDURE NOTES
"Peripheral Block      Patient reassessed immediately prior to procedure    Patient location during procedure: OR  Start time: 7/30/2025 7:40 AM  Stop time: 7/30/2025 7:43 AM  Reason for block: at surgeon's request and post-op pain management  Performed by  CRNA/CAA: Gustavo Shepard CRNA  Preanesthetic Checklist  Completed: patient identified, IV checked, site marked, risks and benefits discussed, surgical consent, monitors and equipment checked, pre-op evaluation and timeout performed  Prep:  Pt Position: supine  Sterile barriers:cap, gloves, mask and washed/disinfected hands  Prep: ChloraPrep  Patient monitoring: blood pressure monitoring, continuous pulse oximetry and EKG  Procedure    Sedation: yes  Performed under: general  Guidance:ultrasound guided  Images:still images obtained, printed/placed on chart    Laterality:Bilateral  Block Type:TAP  Injection Technique:single-shot  Needle Type:short-bevel and echogenic  Needle Gauge:20 G  Resistance on Injection: none    Medications Used: bupivacaine PF (MARCAINE) 0.25 % injection - Injection   60 mL - 7/30/2025 7:43:00 AM      Medications  Comment:Block Injection:  LA dose divided between Right and Left block        Post Assessment  Injection Assessment: negative aspiration for heme, incremental injection and no paresthesia on injection  Patient Tolerance:comfortable throughout block  Complications:no  Additional Notes    Subcostal TAPs    A high-frequency linear transducer, with sterile cover, was placed sub-xiphoid to identify Linea Alba, right and left Rectus Abdominus Muscles (AMANDA). The transducer was moved either right or left subcostally to identify the AMANDA and the Transverse Abdominus Muscle (FLETCHER). The insertion site was prepped in sterile fashion and then localized with 2-5 ml of 1% Lidocaine. Using ultrasound-guidance, a 20-gauge B-Matthews 4\" Ultraplex 360 non-stimulating echogenic needle was advanced in plane, from medial to lateral, until the tip of the " "needle was in the fascial plane between the AMANDA and FLETCHER. 1-3ml of preservative free normal saline was used to hydro-dissect the fascial planes. After the fascial plane was verified, the local anesthetic (LA) was injected. The procedure was repeated on the opposite side for bilateral coverage. Aspiration every 5 ml to prevent intravascular injection. Injection was completed with negative aspiration of blood and negative intravascular injection. Injection pressures were normal with minimal resistance. The subcostal approach to the TAP nerve block ideally anesthetizes the intercostal nerves T6-T9.     Mid-Axillary/Lateral TAPs    A high-frequency linear transducer, with sterile cover, was placed in the midaxillary line between the ASIS and costal margin. The External Oblique Muscle (EOM), Internal Oblique Muscle (IOM), Transverse Abdominus Muscle (FLETCHER), and Peritoneum were identified. The insertion site was prepped in sterile fashion and then localized with 2-5 ml of 1% Lidocaine. Using ultrasound-guidance, a 20-gauge B-Matthews 4\" Ultraplex 360 non-stimulating echogenic needle was advanced in plane, from medial to lateral, until the tip of the needle was in the fascial plane between the IOM and FLETCHER. 1-3ml of preservative free normal saline was used to hydro-dissect the fascial planes. After the fascial plane was verified, the local anesthetic (LA) was injected. The procedure was repeated on the opposite side for bilateral coverage. Aspiration every 5 ml to prevent intravascular injection. Injection was completed with negative aspiration of blood and negative intravascular injection. Injection pressures were normal with minimal resistance. Midaxillary TAPs should reach intercostal nerves T10- T11 and the subcostal nerve T12.    Performed by: Gustavo Shepard CRNA            "

## 2025-07-30 NOTE — NURSING NOTE
Caregiver Telephone Number    Phone Number for Ride/Caregiver: ALEXI MONTANA (DAUGHTER) [phone] (MOBILE)     Who will be staying with you post procedure for the next 24 hours? Name and Phone Number?: ALEXI MONTANA (DAUGHTER) [phone] (MOBILE)

## 2025-07-30 NOTE — ANESTHESIA POSTPROCEDURE EVALUATION
Patient: Mayuri Watts    Procedure Summary       Date: 07/30/25 Room / Location:  [facility] /  [facility]    Anesthesia Start: 0733 Anesthesia Stop: 0909    Procedures:       HIATAL HERNIA REPAIR LAPAROSCOPIC (Abdomen)      ESOPHAGOGASTRODUODENOSCOPY (Esophagus) Diagnosis:       Hiatal hernia with GERD      S/P laparoscopic sleeve gastrectomy      (Hiatal hernia with GERD [K44.9, K21.9])    Surgeons: Garrett Palencia MD Provider: Delano Sorensen MD    Anesthesia Type: general ASA Status: 3            Anesthesia Type: general    Vitals  Vitals Value Taken Time   /63 07/30/25 09:09   Temp 97.9 °F (36.6 °C) 07/30/25 09:09   Pulse 69 07/30/25 09:09   Resp 16 07/30/25 09:09   SpO2 97 % 07/30/25 09:09           Post Anesthesia Care and Evaluation    Patient location during evaluation: PACU  Patient participation: complete - patient participated  Level of consciousness: awake and alert  Pain management: adequate    Airway patency: patent  Anesthetic complications: No anesthetic complications  PONV Status: none  Cardiovascular status: hemodynamically stable and acceptable  Respiratory status: nonlabored ventilation, acceptable and nasal cannula  Hydration status: acceptable

## 2025-07-30 NOTE — ANESTHESIA PREPROCEDURE EVALUATION
Anesthesia Evaluation     Patient summary reviewed and Nursing notes reviewed   no history of anesthetic complications:   NPO Solid Status: > 8 hours  NPO Liquid Status: > 8 hours           Airway   Mallampati: II  TM distance: >3 FB  No difficulty expected  Dental      Pulmonary - normal exam   (+) asthma,sleep apnea  Cardiovascular - normal exam    (+) hypertension, hyperlipidemia      Neuro/Psych  (+) psychiatric history  GI/Hepatic/Renal/Endo    (+) morbid obesity, hiatal hernia, GERD, thyroid problem     Musculoskeletal     Abdominal    Substance History      OB/GYN          Other   arthritis,                 Anesthesia Plan    ASA 3     general     intravenous induction     Anesthetic plan, risks, benefits, and alternatives have been provided, discussed and informed consent has been obtained with: patient.    Plan discussed with CRNA.    CODE STATUS:

## 2025-07-30 NOTE — ANESTHESIA PROCEDURE NOTES
Airway  Reason: elective    Date/Time: 7/30/2025 7:39 AM  Airway not difficult    General Information and Staff    Patient location during procedure: OR  CRNA/CAA: Gustavo Shepard CRNA    Indications and Patient Condition  Indications for airway management: airway protection    Preoxygenated: yes  MILS not maintained throughout    Mask difficulty assessment: 1 - vent by mask    Final Airway Details    Final airway type: endotracheal airway      Successful airway: ETT  Cuffed: yes   Successful intubation technique: video laryngoscopy  Endotracheal tube insertion site: oral  Blade: Bentley  Blade size: 3  ETT size (mm): 7.5  Cormack-Lehane Classification: grade I - full view of glottis  Placement verified by: chest auscultation and capnometry   Measured from: lips  ETT/EBT  to lips (cm): 22  Number of attempts at approach: 1  Assessment: lips, teeth, and gum same as pre-op and atraumatic intubation    Additional Comments  Negative epigastric sounds, Breath sound equal bilaterally with symmetric chest rise and fall

## 2025-07-30 NOTE — BRIEF OP NOTE
HIATAL HERNIA REPAIR LAPAROSCOPIC  Progress Note    Mayuri Watts  7/30/2025    Pre-op Diagnosis:   Hiatal hernia with GERD [K44.9, K21.9]       Post-Op Diagnosis Codes:     * Hiatal hernia with GERD [K44.9, K21.9]     * S/P laparoscopic sleeve gastrectomy [Z98.84]    Procedure(s):      Procedure(s):  HIATAL HERNIA REPAIR LAPAROSCOPIC  ESOPHAGOGASTRODUODENOSCOPY              Surgeon(s):  Garrett Palencia MD Weiss, George Derek, MD    Anesthesia: General with Block    Staff:   Circulator: Joanna Regalado RN  Scrub Person: Kareen Lucio  Nursing Assistant: Tamar Ta CNA       Estimated Blood Loss: minimal    Urine Voided: * No values recorded between 7/30/2025  7:33 AM and 7/30/2025  8:58 AM *    Specimens:                None      Drains: * No LDAs found *    Findings:         Complications: None          Garrett Palencia MD     Date: 7/30/2025  Time: 09:00 EDT

## 2025-07-30 NOTE — OP NOTE
Preoperative Diagnosis:              Hiatal hernia, GE reflux, and dysphagia status post laparoscopic sleeve gastrectomy December 2016                      Postoperative Diagnosis:                                Same     Procedure:                                                   Laparoscopic sliding hiatal hernia repair (not paraesophageal) with falciform ligament reinforcement    EGD                                                                                 Surgeon:                                                       INDIANA Palencia MD     Anesthesia:                                                   GETA     EBL:                                                              Min     Fluids:                                                           Crystalloid     Specimens:                                                   None     Drains:                                                           None     Counts:                                                          Correct     Complications:                                               None     Indications:   This is a 59-year-old disabled previously super morbidly obese female known to me status post laparoscopic sleeve gastrectomy in December 2016.  She developed significant reflux symptoms in 2023.  At the time she weighed 337 pounds and had a BMI of 56.1.  Currently weighs 205 pounds, BMI 33.5.  She is a previous longstanding smoker, currently vaping nicotine products.  Workup reveals a large hiatal hernia, Bravo pH study in March 2023 DeMeester score 23.9, esophageal dysmotility.  The hiatal hernia appeared to extend from 33 to 39 cm from the incisors and distal esophageal biopsies were suggestive of reflux otherwise unremarkable.  It was felt that she would benefit from revision to a Mike-en-Y gastric bypass however given her ongoing nicotine use was felt the safer option would be to pursue with hiatal hernia repair as a stand-alone procedure to  see if this might alleviate her symptoms and avoid revision to gastric bypass.  Please see our office notes.  Risks, benefits, and alternative therapies were discussed and she wished to proceed with laparoscopic hiatal hernia repair and EGD.     Operative technique:      The patient was brought to the operating room, and placed supine upon the operating room table.  SCD hose were placed, she underwent uneventful general endotracheal anesthesia per the anesthesiology staff, she received IV Ancef and subcutaneous Lovenox, the anesthesiology staff performed a TAP block, and the abdomen was prepped and draped with ChloraPrep in a sterile fashion, an Ioban was used as well, a Lopez catheter was not placed.    The peritoneal cavity was entered in the upper abdomen to the left of midline using an 11 mm fascial splitting trocar utilizing an Optiview technique and the abdomen was insufflated to a pressure of 15 mmHg with CO2 gas.  It was noted that she had very thin soft attenuated fascia.  Exploratory laparoscopy revealed no evidence of injury from the entrance technique, a mildly enlarged smooth fatty appearing liver, some omental adhesions to the falciform near the liver and omental adhesions to the anterior abdominal wall in the midline around the umbilical region.    Remaining trocars were placed under direct visualization.  An additional 11 mm fascial splitting trocar was placed in the left mid abdomen and a 5 mm trocar in the left lateral abdomen and the omental adhesions to the intra-abdominal wall were lysed with the Enseal device and a 5 mm trocar was placed in the right lateral abdomen.    Through a stab incision in the epigastrium a Karma retractor was placed and used to elevate the left lobe of the liver.  At this time the sleeve appeared to be resting nicely and no obvious hiatal hernia but there were some adhesions around the hiatus.  Photo obtained.    Omental adhesions to the lateral sleeve were taken  down exposing the left wendy.  There appeared to be quite a bit of chronic scarring.  The pars flaccida was divided, there was not a replaced hepatic vessel.  The hernia sac was incised along the base of the right and left crura and extended up and across the phrenoesophageal membrane.  The hernia sac and its contents were dissected out of the mediastinum and reduced to below the level of the crura.  There was not a paraesophageal component.  Dissection proceeded well into the mediastinum and approximately 3 cm of intra-abdominal esophagus obtained.  A latex free drain was used temporarily for esophageal retraction.  The crura were dissected to their meeting point inferiorly. The hiatal hernia repair was performed posteriorly using a running nonabsorbable 2-0 V-Loc suture with good result, photodocumentation before and after repair was obtained.      The falciform ligament was amputated at its base and mobilized up to the level of the liver and passed behind the esophagus in front of the crural repair.  The end was sutured as a sling over the angle of Hiss using a running nonobserved 2-0 V-loc suture.  The GE junction medially was sutured to the falciform ligament along the lesser curvature in a similar fashion.  Photo obtained.    The sleeve appeared to be narrowed proximally with associated S shaped pattern and kinking.  Further adhesiolysis carried out trying to straighten out this area with minimal improvement.    I scrubbed out and performed upper endoscopy.  There was some retained particulate secretions throughout the esophagus which were suctioned free.  No visible hiatal hernia, Z-line roughly 38 cm.  The area of the hiatus was unremarkable and the endoscope advanced without resistance through this area.  Approximately there was an S shaped pattern to the sleeve with mild pain without resistance to passage of the endoscope.  There was also an S shaped pattern around the angularis without narrowing or  twisting..  No bile in the stomach.  I attempted to obtain photos of the GE junction and pylorus however the images could not be printed.    I scrubbed back in.  The Karma retractor was removed.   All trocars were removed under direct visualization, no bleeding noted from their sites.  Skin in each incision was closed using 3-0 Monocryl plus in an interrupted subcuticular stitch followed by skin glue.      The patient tolerated the procedure well without complication, was taken to the recovery room in stable condition.

## 2025-07-30 NOTE — INTERVAL H&P NOTE
H&P updated. The patient was examined and the following changes are noted:  Daughter in room with her.  MRSA screen negative.  Pt wants to go home postop, instructions discussed.

## 2025-08-15 ENCOUNTER — OFFICE VISIT (OUTPATIENT)
Dept: BARIATRICS/WEIGHT MGMT | Facility: CLINIC | Age: 60
End: 2025-08-15
Payer: MEDICARE

## 2025-08-15 VITALS
WEIGHT: 197.8 LBS | HEART RATE: 90 BPM | BODY MASS INDEX: 31.79 KG/M2 | SYSTOLIC BLOOD PRESSURE: 110 MMHG | DIASTOLIC BLOOD PRESSURE: 80 MMHG | HEIGHT: 66 IN | RESPIRATION RATE: 18 BRPM | TEMPERATURE: 98 F

## 2025-08-15 DIAGNOSIS — E66.811 OBESITY, CLASS I, BMI 30-34.9: Primary | ICD-10-CM

## 2025-08-15 PROCEDURE — 99024 POSTOP FOLLOW-UP VISIT: CPT

## (undated) DEVICE — DEFENDO AIR WATER SUCTION AND BIOPSY VALVE KIT FOR  OLYMPUS: Brand: DEFENDO AIR/WATER/SUCTION AND BIOPSY VALVE

## (undated) DEVICE — GLV SURG SENSICARE PI MIC PF SZ9 LF STRL

## (undated) DEVICE — SYR CONTRL LUERLOK 10CC

## (undated) DEVICE — TROCAR: Brand: KII FIOS FIRST ENTRY

## (undated) DEVICE — PENROSE DRAIN 18 X .5" SILICONE: Brand: MEDLINE

## (undated) DEVICE — PATIENT RETURN ELECTRODE, SINGLE-USE, CONTACT QUALITY MONITORING, ADULT, WITH 9FT CORD, FOR PATIENTS WEIGING OVER 33LBS. (15KG): Brand: MEGADYNE

## (undated) DEVICE — LAPAROVUE VISIBILITY SYSTEM LAPAROSCOPIC SOLUTIONS: Brand: LAPAROVUE

## (undated) DEVICE — GOWN,SIRUS,NONRNF,SETINSLV,2XL,18/CS: Brand: MEDLINE

## (undated) DEVICE — ENDOPATH XCEL UNIVERSAL TROCAR STABLILITY SLEEVES: Brand: ENDOPATH XCEL

## (undated) DEVICE — 4-PORT MANIFOLD: Brand: NEPTUNE 2

## (undated) DEVICE — THE BITE BLOCK MAXI, LATEX FREE STRAP IS USED TO PROTECT THE ENDOSCOPE INSERTION TUBE FROM BEING BITTEN BY THE PATIENT.

## (undated) DEVICE — ST TBG CONN PNEUMOCLEAR EVAC SMOKE HEAT/HUMID

## (undated) DEVICE — Device

## (undated) DEVICE — SHT AIR TRANSFR COMFRT GLIDE LAT 40X80IN

## (undated) DEVICE — ENSEAL LAPAROSCOPIC TISSUE SEALER G2 ARTICULATING CURVED JAW FOR USE WITH G2 GENERATOR 5MM DIAMETER 45CM SHAFT LENGTH: Brand: ENSEAL

## (undated) DEVICE — BLANKT WARM UPPR/BDY ARM/OUT 57X196CM

## (undated) DEVICE — TUBING, SUCTION, 1/4" X 20', STRAIGHT: Brand: MEDLINE INDUSTRIES, INC.

## (undated) DEVICE — FRCP BX RADJAW4 NDL 2.8 240CM LG OG BX40

## (undated) DEVICE — CONTN GRAD MEAS TRIANG 32OZ BLK

## (undated) DEVICE — GLV SURG SENSICARE PI MIC PF SZ8.5 LF STRL

## (undated) DEVICE — PK BARIATRIC 10

## (undated) DEVICE — ANTIBACTERIAL VIOLET BRAIDED (POLYGLACTIN 910), SYNTHETIC ABSORBABLE SUTURE: Brand: COATED VICRYL